# Patient Record
Sex: FEMALE | Race: AMERICAN INDIAN OR ALASKA NATIVE | NOT HISPANIC OR LATINO | Employment: OTHER | URBAN - METROPOLITAN AREA
[De-identification: names, ages, dates, MRNs, and addresses within clinical notes are randomized per-mention and may not be internally consistent; named-entity substitution may affect disease eponyms.]

---

## 2017-11-06 ENCOUNTER — HOSPITAL ENCOUNTER (EMERGENCY)
Facility: HOSPITAL | Age: 67
Discharge: HOME | End: 2017-11-06
Attending: EMERGENCY MEDICINE
Payer: MEDICARE

## 2017-11-06 VITALS
BODY MASS INDEX: 36.37 KG/M2 | OXYGEN SATURATION: 98 % | TEMPERATURE: 99 F | RESPIRATION RATE: 16 BRPM | DIASTOLIC BLOOD PRESSURE: 92 MMHG | HEIGHT: 64 IN | HEART RATE: 55 BPM | SYSTOLIC BLOOD PRESSURE: 155 MMHG | WEIGHT: 213 LBS

## 2017-11-06 DIAGNOSIS — I95.9 HYPOTENSION, UNSPECIFIED HYPOTENSION TYPE: Primary | ICD-10-CM

## 2017-11-06 PROCEDURE — 99283 EMERGENCY DEPT VISIT LOW MDM: CPT

## 2017-11-07 NOTE — ED PROVIDER NOTES
Encounter Date: 11/6/2017       History     Chief Complaint   Patient presents with    Hypotension     Pt states she checked her BP at home and it was 131/34 at home     States taking blood pressure at local store.  States blood pressure was low.  States 130/22.   Come to ED.  Denies any discomforts      The history is provided by the patient.   General Illness    The current episode started just prior to arrival. The problem has been resolved. The pain is at a severity of 0/10. Nothing relieves the symptoms. Nothing aggravates the symptoms. Pertinent negatives include no fever, no decreased vision, no double vision, no eye itching, no photophobia, no abdominal pain, no constipation, no diarrhea, no nausea, no vomiting, no congestion, no ear discharge, no ear pain, no headaches, no hearing loss, no mouth sores, no rhinorrhea, no stridor, no swollen glands, no muscle aches, no neck pain, no neck stiffness, no cough, no shortness of breath, no URI, no rash, no diaper rash, no discharge, no pain and no eye redness. She has received no recent medical care.     Review of patient's allergies indicates:   Allergen Reactions    Dye     Sulfa (sulfonamide antibiotics)      Past Medical History:   Diagnosis Date    Hypertension     Leukemia      History reviewed. No pertinent surgical history.  History reviewed. No pertinent family history.  Social History   Substance Use Topics    Smoking status: Never Smoker    Smokeless tobacco: Never Used    Alcohol use No     Review of Systems   Constitutional: Negative for fever.   HENT: Negative for congestion, ear discharge, ear pain, hearing loss, mouth sores and rhinorrhea.    Eyes: Negative for double vision, photophobia, pain, discharge, redness and itching.   Respiratory: Negative for cough, shortness of breath and stridor.    Gastrointestinal: Negative for abdominal pain, constipation, diarrhea, nausea and vomiting.   Genitourinary: Negative for flank pain and frequency.    Musculoskeletal: Negative for neck pain.   Skin: Negative for color change, pallor, rash and wound.   Neurological: Negative for headaches.       Physical Exam     Initial Vitals [11/06/17 1837]   BP Pulse Resp Temp SpO2   (!) 198/94 62 14 98.9 °F (37.2 °C) 98 %      MAP       128.67         Physical Exam    Nursing note and vitals reviewed.  Constitutional: She appears well-developed and well-nourished. She is not diaphoretic. No distress.   HENT:   Head: Normocephalic and atraumatic.   Mouth/Throat: No oropharyngeal exudate.   Eyes: EOM are normal. Pupils are equal, round, and reactive to light. Right eye exhibits no discharge. Left eye exhibits no discharge.   Neck: Normal range of motion. Neck supple. No JVD present.   Pulmonary/Chest: Breath sounds normal. No stridor. No respiratory distress. She has no wheezes. She has no rhonchi. She has no rales.   Abdominal: Soft. Bowel sounds are normal. She exhibits no distension. There is no tenderness. There is no rebound and no guarding.   Musculoskeletal: Normal range of motion. She exhibits no edema or tenderness.   Neurological: She is alert and oriented to person, place, and time. She has normal strength. No cranial nerve deficit or sensory deficit.   Skin: No rash noted.         ED Course   Procedures  Labs Reviewed - No data to display                            ED Course      Clinical Impression:   The encounter diagnosis was Hypotension, unspecified hypotension type.    Disposition:   Disposition: Discharged  Condition: Stable                        Negro Castillo MD  11/06/17 6091

## 2017-11-07 NOTE — ED TRIAGE NOTES
Pt reports to ED with c/o hypotension. Pt states she checked her BP twice at [facility] and her BP [id number] and 130/24. Pt current /94. When checked again pt BP was 175/90. Pt takes Norvasc and Lopressor at home and reports taking it before lunch today.

## 2018-05-27 ENCOUNTER — HOSPITAL ENCOUNTER (EMERGENCY)
Facility: HOSPITAL | Age: 68
Discharge: HOME | End: 2018-05-28
Attending: SURGERY
Payer: MEDICARE

## 2018-05-27 DIAGNOSIS — R10.9 SIDE PAIN: ICD-10-CM

## 2018-05-27 DIAGNOSIS — R10.9 FLANK PAIN: Primary | ICD-10-CM

## 2018-05-27 DIAGNOSIS — N30.00 ACUTE CYSTITIS WITHOUT HEMATURIA: ICD-10-CM

## 2018-05-27 LAB
ALBUMIN SERPL BCP-MCNC: 4.1 G/DL
ALP SERPL-CCNC: 78 U/L
ALT SERPL W/O P-5'-P-CCNC: 15 U/L
ANION GAP SERPL CALC-SCNC: 11 MMOL/L
APTT BLDCRRT: 21.1 SEC
AST SERPL-CCNC: 16 U/L
BACTERIA #/AREA URNS HPF: ABNORMAL /HPF
BASOPHILS # BLD AUTO: 0.02 K/UL
BASOPHILS NFR BLD: 0.3 %
BILIRUB SERPL-MCNC: 0.5 MG/DL
BILIRUB UR QL STRIP: NEGATIVE
BNP SERPL-MCNC: <10 PG/ML
BUN SERPL-MCNC: 25 MG/DL
CALCIUM SERPL-MCNC: 10 MG/DL
CHLORIDE SERPL-SCNC: 109 MMOL/L
CK MB SERPL-MCNC: 1.7 NG/ML
CK MB SERPL-RTO: 1.3 %
CK SERPL-CCNC: 135 U/L
CK SERPL-CCNC: 135 U/L
CLARITY UR: CLEAR
CO2 SERPL-SCNC: 22 MMOL/L
COLOR UR: YELLOW
CREAT SERPL-MCNC: 1.1 MG/DL
DEPRECATED S PYO AG THROAT QL EIA: NEGATIVE
DIFFERENTIAL METHOD: ABNORMAL
EOSINOPHIL # BLD AUTO: 0.1 K/UL
EOSINOPHIL NFR BLD: 2 %
ERYTHROCYTE [DISTWIDTH] IN BLOOD BY AUTOMATED COUNT: 19.9 %
EST. GFR  (AFRICAN AMERICAN): 59 ML/MIN/1.73 M^2
EST. GFR  (NON AFRICAN AMERICAN): 51 ML/MIN/1.73 M^2
FLUAV AG SPEC QL IA: NEGATIVE
FLUBV AG SPEC QL IA: NEGATIVE
GLUCOSE SERPL-MCNC: 106 MG/DL
GLUCOSE UR QL STRIP: NEGATIVE
HCT VFR BLD AUTO: 38.5 %
HETEROPH AB SERPL QL IA: NEGATIVE
HGB BLD-MCNC: 12.4 G/DL
HGB UR QL STRIP: ABNORMAL
HIV1+2 IGG SERPL QL IA.RAPID: NEGATIVE
HYALINE CASTS #/AREA URNS LPF: 1 /LPF
INR PPP: 0.9
KETONES UR QL STRIP: NEGATIVE
LACTATE SERPL-SCNC: 1.1 MMOL/L
LEUKOCYTE ESTERASE UR QL STRIP: NEGATIVE
LYMPHOCYTES # BLD AUTO: 2.6 K/UL
LYMPHOCYTES NFR BLD: 39.9 %
MAGNESIUM SERPL-MCNC: 2.8 MG/DL
MCH RBC QN AUTO: 26 PG
MCHC RBC AUTO-ENTMCNC: 32.2 G/DL
MCV RBC AUTO: 81 FL
MICROSCOPIC COMMENT: ABNORMAL
MONOCYTES # BLD AUTO: 0.9 K/UL
MONOCYTES NFR BLD: 13.1 %
NEUTROPHILS # BLD AUTO: 3 K/UL
NEUTROPHILS NFR BLD: 44.7 %
NITRITE UR QL STRIP: NEGATIVE
PH UR STRIP: 6 [PH] (ref 5–8)
PHOSPHATE SERPL-MCNC: 3.2 MG/DL
PLATELET # BLD AUTO: 216 K/UL
PMV BLD AUTO: 10.7 FL
POTASSIUM SERPL-SCNC: 4.1 MMOL/L
PROT SERPL-MCNC: 8.2 G/DL
PROT UR QL STRIP: ABNORMAL
PROTHROMBIN TIME: 9.5 SEC
RBC # BLD AUTO: 4.77 M/UL
RBC #/AREA URNS HPF: 2 /HPF (ref 0–4)
SODIUM SERPL-SCNC: 142 MMOL/L
SP GR UR STRIP: 1.02 (ref 1–1.03)
SPECIMEN SOURCE: NORMAL
SQUAMOUS #/AREA URNS HPF: 3 /HPF
TROPONIN I SERPL DL<=0.01 NG/ML-MCNC: 0.01 NG/ML
TSH SERPL DL<=0.005 MIU/L-ACNC: 1.84 UIU/ML
URN SPEC COLLECT METH UR: ABNORMAL
UROBILINOGEN UR STRIP-ACNC: NEGATIVE EU/DL
WBC # BLD AUTO: 6.59 K/UL
WBC #/AREA URNS HPF: 30 /HPF (ref 0–5)

## 2018-05-27 PROCEDURE — 86308 HETEROPHILE ANTIBODY SCREEN: CPT

## 2018-05-27 PROCEDURE — 99284 EMERGENCY DEPT VISIT MOD MDM: CPT | Mod: 25

## 2018-05-27 PROCEDURE — 83735 ASSAY OF MAGNESIUM: CPT

## 2018-05-27 PROCEDURE — 84100 ASSAY OF PHOSPHORUS: CPT

## 2018-05-27 PROCEDURE — 87081 CULTURE SCREEN ONLY: CPT | Mod: 59

## 2018-05-27 PROCEDURE — 87400 INFLUENZA A/B EACH AG IA: CPT | Mod: 59

## 2018-05-27 PROCEDURE — 87880 STREP A ASSAY W/OPTIC: CPT

## 2018-05-27 PROCEDURE — 84484 ASSAY OF TROPONIN QUANT: CPT

## 2018-05-27 PROCEDURE — 87040 BLOOD CULTURE FOR BACTERIA: CPT | Mod: 59

## 2018-05-27 PROCEDURE — 80053 COMPREHEN METABOLIC PANEL: CPT

## 2018-05-27 PROCEDURE — 82553 CREATINE MB FRACTION: CPT

## 2018-05-27 PROCEDURE — 36415 COLL VENOUS BLD VENIPUNCTURE: CPT

## 2018-05-27 PROCEDURE — 85025 COMPLETE CBC W/AUTO DIFF WBC: CPT

## 2018-05-27 PROCEDURE — 93005 ELECTROCARDIOGRAM TRACING: CPT

## 2018-05-27 PROCEDURE — 86701 HIV-1ANTIBODY: CPT

## 2018-05-27 PROCEDURE — 85730 THROMBOPLASTIN TIME PARTIAL: CPT

## 2018-05-27 PROCEDURE — 25000003 PHARM REV CODE 250: Performed by: SURGERY

## 2018-05-27 PROCEDURE — 81000 URINALYSIS NONAUTO W/SCOPE: CPT

## 2018-05-27 PROCEDURE — 93010 ELECTROCARDIOGRAM REPORT: CPT | Mod: ,,, | Performed by: INTERNAL MEDICINE

## 2018-05-27 PROCEDURE — 83605 ASSAY OF LACTIC ACID: CPT

## 2018-05-27 PROCEDURE — 84443 ASSAY THYROID STIM HORMONE: CPT

## 2018-05-27 PROCEDURE — 96372 THER/PROPH/DIAG INJ SC/IM: CPT

## 2018-05-27 PROCEDURE — 83880 ASSAY OF NATRIURETIC PEPTIDE: CPT

## 2018-05-27 PROCEDURE — 82550 ASSAY OF CK (CPK): CPT

## 2018-05-27 PROCEDURE — 85610 PROTHROMBIN TIME: CPT

## 2018-05-27 PROCEDURE — 87086 URINE CULTURE/COLONY COUNT: CPT

## 2018-05-27 RX ORDER — ACETAMINOPHEN 500 MG
1000 TABLET ORAL
Status: COMPLETED | OUTPATIENT
Start: 2018-05-27 | End: 2018-05-27

## 2018-05-27 RX ORDER — IBUPROFEN 800 MG/1
800 TABLET ORAL
Status: COMPLETED | OUTPATIENT
Start: 2018-05-27 | End: 2018-05-27

## 2018-05-27 RX ADMIN — ACETAMINOPHEN 1000 MG: 500 TABLET ORAL at 09:05

## 2018-05-27 RX ADMIN — IBUPROFEN 800 MG: 800 TABLET ORAL at 09:05

## 2018-05-28 VITALS
BODY MASS INDEX: 31.79 KG/M2 | HEART RATE: 70 BPM | DIASTOLIC BLOOD PRESSURE: 72 MMHG | RESPIRATION RATE: 17 BRPM | TEMPERATURE: 99 F | SYSTOLIC BLOOD PRESSURE: 140 MMHG | WEIGHT: 185.19 LBS | OXYGEN SATURATION: 98 %

## 2018-05-28 PROCEDURE — 63600175 PHARM REV CODE 636 W HCPCS: Performed by: SURGERY

## 2018-05-28 RX ORDER — PHENAZOPYRIDINE HYDROCHLORIDE 200 MG/1
200 TABLET, FILM COATED ORAL 3 TIMES DAILY
Qty: 6 TABLET | Refills: 0 | Status: SHIPPED | OUTPATIENT
Start: 2018-05-28 | End: 2018-06-07

## 2018-05-28 RX ORDER — NITROFURANTOIN 25; 75 MG/1; MG/1
100 CAPSULE ORAL 2 TIMES DAILY
Qty: 14 CAPSULE | Refills: 0 | Status: SHIPPED | OUTPATIENT
Start: 2018-05-28 | End: 2018-06-04

## 2018-05-28 RX ORDER — CEFTRIAXONE 1 G/1
1 INJECTION, POWDER, FOR SOLUTION INTRAMUSCULAR; INTRAVENOUS
Status: COMPLETED | OUTPATIENT
Start: 2018-05-28 | End: 2018-05-28

## 2018-05-28 RX ADMIN — CEFTRIAXONE SODIUM 1 G: 1 INJECTION, POWDER, FOR SOLUTION INTRAMUSCULAR; INTRAVENOUS at 12:05

## 2018-05-28 NOTE — ED PROVIDER NOTES
[facility]                                                 Chief Complaint  68 y.o. female with Flank Pain    History of Present Illness  Leticia Mejia presents to the emergency room with right flank pain tonight  Patient presents with right flank pain, mild dysuria on ER ROS is evening  Patient on exam has no obvious tenderness, no flank pain, soft abdomen  Patient has a low-grade temperature but none fever, denies any hematuria  Patient is currently on a daily oral medication for leukemia, sees oncology  Patient has no nausea vomiting, has no other symptoms except flank pain    The history is provided by the patient   device was not used during this ER visit  Medical history: Hypertension & leukemia  History reviewed. No pertinent surgical history.   ALLERGIES: Dye and sulfa  History reviewed. No pertinent family history.    Review of Systems and Physical Exam      Review of Systems - provided by the patient  -- Constitution - no fever, denies fatigue, no weakness, no chills  -- Eyes - no tearing or redness, no visual disturbance  -- Ear, Nose - no tinnitus or earache, no nasal congestion or discharge  -- Mouth,Throat - no sore throat, no toothache, normal voice, normal swallowing  -- Respiratory - denies cough and congestion, no shortness of breath, no SNYDER  -- Cardiovascular - denies chest pain, no palpitations, denies claudication  -- Gastrointestinal - denies abdominal pain, nausea, vomiting, or diarrhea  -- Genitourinary - right flank pain, no hematuria or frequency   -- Musculoskeletal - denies back pain, negative for myalgias and arthralgias   -- Neurological - no headache, denies weakness or seizure; no LOC  -- Skin - denies pallor, rash, or changes in skin. no hives or welts noted    BP (!) 140/72  Pulse 70   Temp 98.7 °F (37.1 °C) (Oral)   Resp 17     Physical Exam  -- Nursing note and vitals reviewed  -- Constitutional: Appears well-developed and  well-nourished  -- Head: Atraumatic. Normocephalic. No obvious abnormality  -- Eyes: Pupils are equal and reactive to light. Normal conjunctiva and lids  -- Nose: Nose normal in appearance, nares grossly normal. No discharge  -- Throat: Mucous membranes moist, pharynx normal, normal tonsils. No lesions   -- Ears: External ears and TM normal bilaterally. Normal hearing and no drainage  -- Neck: Normal range of motion. Neck supple. No masses, trachea midline  -- Cardiac: Normal rate, regular rhythm and normal heart sounds  -- Pulmonary: Normal respiratory effort, breath sounds clear to auscultation  -- Abdominal: Soft, no tenderness. Normal bowel sounds. Normal liver edge  -- Genitourinary: no flank pain on exam, no suprapubic pain by palpation   -- Musculoskeletal: Normal range of motion, no effusions. Joints stable   -- Neurological: No focal deficits. Showed good interaction with staff  -- Vascular: Posterior tibial, dorsalis pedis and radial pulses 2+ bilaterally      Emergency Room Course      Labs     K 4.1      CO2 22 (L)   BUN 25 (H)   CREATININE 1.1      ALKPHOS 78   AST 16   ALT 15   BILITOT 0.5   ALBUMIN 4.1   PROT 8.2   WBC 6.59   HGB 12.4   HCT 38.5            CPKMB 1.7   TROPONINI 0.015   INR 0.9   BNP <10   LACTATE 1.1   MG 2.8 (H)   TSH 1.841     Urinalysis  -- Urinalysis performed during this ER visit showed signs of infection  -- The urine today has been sent for lab culture, results pending      EKG  -- The EKG findings today were without concerning findings from baseline    Radiology  -- Chest x-ray showed no infiltrate and showed no acute pathology    Additional Work up  -- Blood cultures have also been drawn, results are pending    Medications Given  -- acetaminophen tablet 1,000 mg (1,000 mg Oral Given 5/27/18 2115)   -- ibuprofen tablet 800 mg (800 mg Oral Given 5/27/18 2115)   -- cefTRIAXone injection 1 g (1 g Intramuscular Given 5/28/18 0028)      Medical Decision Making  -- Diagnosis management comments: 68 y.o. female with right flank pain tonight  -- Patient also had a low-grade temperature, negative workup except UTI  -- Patient was given a shot of Rocephin, blood and urine cultures performed today  -- Chest x-ray within normal limits, CT scan of the abdomen within normal limits  -- Patient to follow up with her PCP tomorrow, counseled on temperature warning signs  -- Patient states she will follow up with her oncologist/leukemia M.D. and PCP  -- Patient will try Macrobid, afebrile and asymptomatic on discharge is evening    Diagnosis  -- Flank pain  -- Side pain   -- Acute cystitis without hematuria     Disposition and Plan  -- Disposition: home  -- Condition: stable  -- Follow-up: Patient to follow up with Vicky Barahona MD in 1-2 days.  -- I advised the patient that we have found no life threatening condition today  -- At this time, I believe the patient is clinically stable for discharge.   -- The patient acknowledges that close follow up with a MD is required   -- Patient agrees to comply with all instruction and direction given in the ER    This note is dictated on Dragon Natural Speaking word recognition program.  There are word recognition mistakes that are occasionally missed on review.            Cristi Manzo MD  05/28/18 2095

## 2018-05-29 LAB — BACTERIA UR CULT: NORMAL

## 2018-05-30 LAB — BACTERIA THROAT CULT: NORMAL

## 2018-06-02 LAB
BACTERIA BLD CULT: NORMAL
BACTERIA BLD CULT: NORMAL

## 2018-11-25 ENCOUNTER — HOSPITAL ENCOUNTER (EMERGENCY)
Facility: HOSPITAL | Age: 68
Discharge: HOME | End: 2018-11-25
Attending: SURGERY
Payer: MEDICARE

## 2018-11-25 VITALS
TEMPERATURE: 97 F | OXYGEN SATURATION: 97 % | SYSTOLIC BLOOD PRESSURE: 169 MMHG | RESPIRATION RATE: 18 BRPM | BODY MASS INDEX: 31.76 KG/M2 | WEIGHT: 185 LBS | DIASTOLIC BLOOD PRESSURE: 75 MMHG | HEART RATE: 65 BPM

## 2018-11-25 DIAGNOSIS — G89.29 CHRONIC LOW BACK PAIN WITHOUT SCIATICA, UNSPECIFIED BACK PAIN LATERALITY: Primary | ICD-10-CM

## 2018-11-25 DIAGNOSIS — M54.50 CHRONIC LOW BACK PAIN WITHOUT SCIATICA, UNSPECIFIED BACK PAIN LATERALITY: Primary | ICD-10-CM

## 2018-11-25 LAB
ALBUMIN SERPL BCP-MCNC: 3.9 G/DL
ALP SERPL-CCNC: 66 U/L
ALT SERPL W/O P-5'-P-CCNC: 11 U/L
ANION GAP SERPL CALC-SCNC: 12 MMOL/L
ANISOCYTOSIS BLD QL SMEAR: SLIGHT
AST SERPL-CCNC: 16 U/L
BASOPHILS NFR BLD: 1 %
BILIRUB SERPL-MCNC: 0.4 MG/DL
BUN SERPL-MCNC: 16 MG/DL
CALCIUM SERPL-MCNC: 9.7 MG/DL
CHLORIDE SERPL-SCNC: 109 MMOL/L
CO2 SERPL-SCNC: 21 MMOL/L
CREAT SERPL-MCNC: 0.9 MG/DL
DIFFERENTIAL METHOD: ABNORMAL
EOSINOPHIL NFR BLD: 2 %
ERYTHROCYTE [DISTWIDTH] IN BLOOD BY AUTOMATED COUNT: 18.2 %
EST. GFR  (AFRICAN AMERICAN): >60 ML/MIN/1.73 M^2
EST. GFR  (NON AFRICAN AMERICAN): >60 ML/MIN/1.73 M^2
GIANT PLATELETS BLD QL SMEAR: PRESENT
GLUCOSE SERPL-MCNC: 110 MG/DL
HCT VFR BLD AUTO: 37.9 %
HGB BLD-MCNC: 12.3 G/DL
LYMPHOCYTES NFR BLD: 27 %
MCH RBC QN AUTO: 27 PG
MCHC RBC AUTO-ENTMCNC: 32.5 G/DL
MCV RBC AUTO: 83 FL
MONOCYTES NFR BLD: 8 %
NEUTROPHILS NFR BLD: 60 %
NEUTS BAND NFR BLD MANUAL: 2 %
PLATELET # BLD AUTO: 185 K/UL
PLATELET BLD QL SMEAR: ABNORMAL
PMV BLD AUTO: 10.6 FL
POTASSIUM SERPL-SCNC: 3.7 MMOL/L
PROT SERPL-MCNC: 7.6 G/DL
RBC # BLD AUTO: 4.56 M/UL
SCHISTOCYTES BLD QL SMEAR: ABNORMAL
SODIUM SERPL-SCNC: 142 MMOL/L
WBC # BLD AUTO: 5.32 K/UL

## 2018-11-25 PROCEDURE — 85027 COMPLETE CBC AUTOMATED: CPT

## 2018-11-25 PROCEDURE — 99284 EMERGENCY DEPT VISIT MOD MDM: CPT | Mod: 25

## 2018-11-25 PROCEDURE — 63600175 PHARM REV CODE 636 W HCPCS: Performed by: SURGERY

## 2018-11-25 PROCEDURE — 80053 COMPREHEN METABOLIC PANEL: CPT

## 2018-11-25 PROCEDURE — 96372 THER/PROPH/DIAG INJ SC/IM: CPT | Mod: 59

## 2018-11-25 PROCEDURE — 85007 BL SMEAR W/DIFF WBC COUNT: CPT

## 2018-11-25 PROCEDURE — 36415 COLL VENOUS BLD VENIPUNCTURE: CPT

## 2018-11-25 RX ORDER — HYDROCODONE BITARTRATE AND ACETAMINOPHEN 5; 325 MG/1; MG/1
1 TABLET ORAL EVERY 4 HOURS PRN
Qty: 8 TABLET | Refills: 0 | Status: SHIPPED | OUTPATIENT
Start: 2018-11-25 | End: 2018-12-05

## 2018-11-25 RX ORDER — ONDANSETRON 2 MG/ML
4 INJECTION INTRAMUSCULAR; INTRAVENOUS
Status: COMPLETED | OUTPATIENT
Start: 2018-11-25 | End: 2018-11-25

## 2018-11-25 RX ORDER — CYCLOBENZAPRINE HCL 10 MG
10 TABLET ORAL 3 TIMES DAILY PRN
Qty: 10 TABLET | Refills: 0 | Status: SHIPPED | OUTPATIENT
Start: 2018-11-25 | End: 2018-11-30

## 2018-11-25 RX ORDER — MORPHINE SULFATE 4 MG/ML
2 INJECTION, SOLUTION INTRAMUSCULAR; INTRAVENOUS
Status: COMPLETED | OUTPATIENT
Start: 2018-11-25 | End: 2018-11-25

## 2018-11-25 RX ADMIN — MORPHINE SULFATE 2 MG: 4 INJECTION, SOLUTION INTRAMUSCULAR; INTRAVENOUS at 05:11

## 2018-11-25 RX ADMIN — ONDANSETRON 4 MG: 2 INJECTION INTRAMUSCULAR; INTRAVENOUS at 05:11

## 2018-11-25 NOTE — ED PROVIDER NOTES
[facility]                                                 Chief Complaint  68 y.o. female with Spasms    History of Present Illness  Leticia Mejia presents to the emergency room with low back pain tonight  Patient lifted up on heavy turkey for Thanksgiving with residual back after  Patient denies any trauma or fall but has a history of lower lumbar arthritis  Patient on exam has no step-off or deformity, no bruising or trauma noted  Patient has no leg weakness or signs of cauda equina syndrome on exam  Patient states any movement or activity elicits chronic lower lumbar pain    The history is provided by the patient   device was not used during this ER visit  Medical history: Hypertension & leukemia  History reviewed. No pertinent surgical history.   ALLERGIES: Dye and sulfa  History reviewed. No pertinent family history.    Review of Systems and Physical Exam      Review of Systems  -- Constitution - no fever, denies fatigue, no weakness, no chills  -- Eyes - no tearing or redness, no visual disturbance  -- Ear, Nose - no tinnitus or earache, no nasal congestion or discharge  -- Mouth,Throat - no sore throat, no toothache, normal voice, normal swallowing  -- Respiratory - denies cough and congestion, no shortness of breath, no SNYDER  -- Cardiovascular - denies chest pain, no palpitations, denies claudication  -- Gastrointestinal - denies abdominal pain, nausea, vomiting, or diarrhea  -- Genitourinary - no dysuria, no hematuria, no flank pain, no bladder pain  -- Musculoskeletal - back pain, negative for myalgias and arthralgias   -- Neurological - no headache, denies weakness or seizure; no LOC  -- Skin - denies pallor, rash, or changes in skin. no hives or welts noted    Vital Signs  Her oral temperature is 99.3 °F (37.4 °C).   Her blood pressure is 169/79 and her pulse is 80.   Her respiration is 20 and oxygen saturation is 100%.     Physical Exam  -- Nursing note and vitals  reviewed  -- Constitutional: Appears well-developed and well-nourished  -- Head: Atraumatic. Normocephalic. No obvious abnormality  -- Eyes: Pupils are equal and reactive to light. Normal conjunctiva and lids  -- Cardiac: Normal rate, regular rhythm and normal heart sounds  -- Pulmonary: Normal respiratory effort, breath sounds clear to auscultation  -- Abdominal: Soft, no tenderness. Normal bowel sounds. Normal liver edge  -- Musculoskeletal: Normal range of motion, no effusions. Joints stable   -- Neurological: No focal deficits. Showed good interaction with staff  -- Skin: Warm and dry. No evidence of rash or cellulitis    Emergency Room Course      Treatment and Evaluation  -- The electrolytes drawn in the ER today were within normal limits  -- The CBC drawn in the ER today was within normal limits  -- Lumbar spine shows severe degenerative changes without fracture  -- IM 2 mg Morphine given in the ER  -- IM 4 mg Zofran given today in the ER     Diagnosis  -- Chronic low back pain without sciatica, unspecified back pain laterality.    Disposition and Plan  -- Disposition: home  -- Condition: stable  -- Follow-up: Patient to follow up with Vicky Barahona MD in 1-2 days.  -- I advised the patient that we have found no life threatening condition today  -- At this time, I believe the patient is clinically stable for discharge.   -- The patient acknowledges that close follow up with a MD is required   -- Patient agrees to comply with all instruction and direction given in the ER    This note is dictated on M*Modal word recognition program.  There are word recognition mistakes that are occasionally missed on review.          Cristi Manzo MD  11/25/18 2373

## 2018-11-25 NOTE — ED TRIAGE NOTES
68 y.o. female presents to ER   Chief Complaint   Patient presents with    Spasms   Pt reports spasms in back since Wednesday. Reports picked up a turkey. No acute distress noted.

## 2018-11-26 NOTE — ED NOTES
The patient was seen, evaluated and discharged by Dr Manzo. All questions were asked and/or answered and the pt was discharged with written and verbal instructions.  Discharged to home/self care.    - Condition at discharge: Good  - Mode of Discharge: Wheelchair  - The patient left the ED accompanied by a family member.  - The discharge instructions were discussed with the patient.  - They state an understanding of the discharge instructions.  -Wheeled pt to the discharge station.

## 2018-12-25 ENCOUNTER — HOSPITAL ENCOUNTER (EMERGENCY)
Facility: HOSPITAL | Age: 68
Discharge: HOME | End: 2018-12-25
Attending: SURGERY
Payer: MEDICARE

## 2018-12-25 VITALS
OXYGEN SATURATION: 96 % | WEIGHT: 200 LBS | DIASTOLIC BLOOD PRESSURE: 87 MMHG | RESPIRATION RATE: 18 BRPM | HEART RATE: 74 BPM | SYSTOLIC BLOOD PRESSURE: 166 MMHG | BODY MASS INDEX: 34.33 KG/M2 | TEMPERATURE: 101 F

## 2018-12-25 DIAGNOSIS — J40 BRONCHITIS: Primary | ICD-10-CM

## 2018-12-25 LAB
ALBUMIN SERPL BCP-MCNC: 4.1 G/DL
ALP SERPL-CCNC: 68 U/L
ALT SERPL W/O P-5'-P-CCNC: 22 U/L
ANION GAP SERPL CALC-SCNC: 11 MMOL/L
AST SERPL-CCNC: 25 U/L
BASOPHILS # BLD AUTO: 0.01 K/UL
BASOPHILS NFR BLD: 0.2 %
BILIRUB SERPL-MCNC: 0.5 MG/DL
BUN SERPL-MCNC: 20 MG/DL
CALCIUM SERPL-MCNC: 10.1 MG/DL
CHLORIDE SERPL-SCNC: 105 MMOL/L
CO2 SERPL-SCNC: 25 MMOL/L
CREAT SERPL-MCNC: 0.9 MG/DL
DEPRECATED S PYO AG THROAT QL EIA: NEGATIVE
DIFFERENTIAL METHOD: ABNORMAL
EOSINOPHIL # BLD AUTO: 0.1 K/UL
EOSINOPHIL NFR BLD: 2.3 %
ERYTHROCYTE [DISTWIDTH] IN BLOOD BY AUTOMATED COUNT: 18.1 %
EST. GFR  (AFRICAN AMERICAN): >60 ML/MIN/1.73 M^2
EST. GFR  (NON AFRICAN AMERICAN): >60 ML/MIN/1.73 M^2
GLUCOSE SERPL-MCNC: 93 MG/DL
HCT VFR BLD AUTO: 36.1 %
HGB BLD-MCNC: 11.4 G/DL
INFLUENZA A, MOLECULAR: NEGATIVE
INFLUENZA B, MOLECULAR: NEGATIVE
LYMPHOCYTES # BLD AUTO: 0.9 K/UL
LYMPHOCYTES NFR BLD: 15.1 %
MCH RBC QN AUTO: 26.6 PG
MCHC RBC AUTO-ENTMCNC: 31.6 G/DL
MCV RBC AUTO: 84 FL
MONOCYTES # BLD AUTO: 0.8 K/UL
MONOCYTES NFR BLD: 12.6 %
NEUTROPHILS # BLD AUTO: 4.2 K/UL
NEUTROPHILS NFR BLD: 69.8 %
PLATELET # BLD AUTO: 159 K/UL
PMV BLD AUTO: 10.3 FL
POTASSIUM SERPL-SCNC: 3.9 MMOL/L
PROT SERPL-MCNC: 7.7 G/DL
RBC # BLD AUTO: 4.29 M/UL
SODIUM SERPL-SCNC: 141 MMOL/L
SPECIMEN SOURCE: NORMAL
WBC # BLD AUTO: 6.04 K/UL

## 2018-12-25 PROCEDURE — 87502 INFLUENZA DNA AMP PROBE: CPT

## 2018-12-25 PROCEDURE — 85025 COMPLETE CBC W/AUTO DIFF WBC: CPT

## 2018-12-25 PROCEDURE — 96372 THER/PROPH/DIAG INJ SC/IM: CPT

## 2018-12-25 PROCEDURE — 25000242 PHARM REV CODE 250 ALT 637 W/ HCPCS: Performed by: SURGERY

## 2018-12-25 PROCEDURE — 36415 COLL VENOUS BLD VENIPUNCTURE: CPT

## 2018-12-25 PROCEDURE — 99284 EMERGENCY DEPT VISIT MOD MDM: CPT | Mod: 25

## 2018-12-25 PROCEDURE — 87880 STREP A ASSAY W/OPTIC: CPT

## 2018-12-25 PROCEDURE — 25000003 PHARM REV CODE 250: Performed by: SURGERY

## 2018-12-25 PROCEDURE — 80053 COMPREHEN METABOLIC PANEL: CPT

## 2018-12-25 PROCEDURE — 63600175 PHARM REV CODE 636 W HCPCS: Performed by: SURGERY

## 2018-12-25 PROCEDURE — 87081 CULTURE SCREEN ONLY: CPT

## 2018-12-25 PROCEDURE — 94640 AIRWAY INHALATION TREATMENT: CPT

## 2018-12-25 RX ORDER — PROMETHAZINE HYDROCHLORIDE AND DEXTROMETHORPHAN HYDROBROMIDE 6.25; 15 MG/5ML; MG/5ML
5 SYRUP ORAL EVERY 6 HOURS PRN
Qty: 118 ML | Refills: 0 | Status: SHIPPED | OUTPATIENT
Start: 2018-12-25 | End: 2019-01-04

## 2018-12-25 RX ORDER — IBUPROFEN 800 MG/1
800 TABLET ORAL
Status: COMPLETED | OUTPATIENT
Start: 2018-12-25 | End: 2018-12-25

## 2018-12-25 RX ORDER — IPRATROPIUM BROMIDE AND ALBUTEROL SULFATE 2.5; .5 MG/3ML; MG/3ML
3 SOLUTION RESPIRATORY (INHALATION)
Status: COMPLETED | OUTPATIENT
Start: 2018-12-25 | End: 2018-12-25

## 2018-12-25 RX ORDER — METHYLPREDNISOLONE 4 MG/1
TABLET ORAL
Qty: 1 PACKAGE | Refills: 0 | Status: ON HOLD | OUTPATIENT
Start: 2018-12-25 | End: 2023-07-18 | Stop reason: HOSPADM

## 2018-12-25 RX ORDER — LEVOFLOXACIN 500 MG/1
500 TABLET, FILM COATED ORAL DAILY
Qty: 7 TABLET | Refills: 0 | Status: SHIPPED | OUTPATIENT
Start: 2018-12-25 | End: 2019-01-01

## 2018-12-25 RX ORDER — METHYLPREDNISOLONE SOD SUCC 125 MG
125 VIAL (EA) INJECTION
Status: COMPLETED | OUTPATIENT
Start: 2018-12-25 | End: 2018-12-25

## 2018-12-25 RX ORDER — ACETAMINOPHEN 500 MG
1000 TABLET ORAL
Status: COMPLETED | OUTPATIENT
Start: 2018-12-25 | End: 2018-12-25

## 2018-12-25 RX ORDER — CEFTRIAXONE 1 G/1
1 INJECTION, POWDER, FOR SOLUTION INTRAMUSCULAR; INTRAVENOUS
Status: COMPLETED | OUTPATIENT
Start: 2018-12-25 | End: 2018-12-25

## 2018-12-25 RX ADMIN — IBUPROFEN 800 MG: 800 TABLET ORAL at 04:12

## 2018-12-25 RX ADMIN — ACETAMINOPHEN 1000 MG: 500 TABLET, FILM COATED ORAL at 04:12

## 2018-12-25 RX ADMIN — CEFTRIAXONE SODIUM 1 G: 1 INJECTION, POWDER, FOR SOLUTION INTRAMUSCULAR; INTRAVENOUS at 04:12

## 2018-12-25 RX ADMIN — IPRATROPIUM BROMIDE AND ALBUTEROL SULFATE 3 ML: .5; 3 SOLUTION RESPIRATORY (INHALATION) at 04:12

## 2018-12-25 RX ADMIN — METHYLPREDNISOLONE SODIUM SUCCINATE 125 MG: 125 INJECTION, POWDER, FOR SOLUTION INTRAMUSCULAR; INTRAVENOUS at 04:12

## 2018-12-25 NOTE — ED PROVIDER NOTES
[facility]                                                 Chief Complaint  68 y.o. female with URI (cough/cold/ congestion x 2-3 days)    History of Present Illness  Leticia Mejia presents to the emergency room with nasal congestion today  The patient on exam has clear nasal drainage with nasal mucosa erythema   Patient has a dry hacking cough and denies any sputum production today  Patient has no wheezing or shortness of breath, no dyspnea on exertion  Pt states she has a dry hacking cough with a 96% room air oxygenation  Patient's flu swab was negative, chest x-ray showed no infiltrate in the ER    The history is provided by the patient   device was not used during this ER visit  Medical history: Hypertension & leukemia  History reviewed. No pertinent surgical history.   ALLERGIES: Dye and sulfa  History reviewed. No pertinent family history.    Review of Systems and Physical Exam      Review of Systems  -- Constitution - no fever, denies fatigue, no weakness, no chills  -- Eyes - no tearing or redness, no visual disturbance  -- Ear, Nose - sneezing, nasal congestion and clear discharge   -- Mouth,Throat - no sore throat, no toothache, normal voice, normal swallowing  -- Respiratory - cough and congestion, no shortness of breath, no SNYDER  -- Cardiovascular - denies chest pain, no palpitations, denies claudication  -- Gastrointestinal - denies abdominal pain, nausea, vomiting, or diarrhea  -- Genitourinary - no dysuria, no hematuria, no flank pain, no bladder pain  -- Musculoskeletal - denies back pain, negative for myalgias and arthralgias   -- Neurological - no headache, denies weakness or seizure; no LOC  -- Skin - denies pallor, rash, or changes in skin. no hives or welts noted    Vital Signs  Her oral temperature is 101.2 °F (38.4 °C)  Her blood pressure is 172/93 and her pulse is 77.   Her respiration is 18 and oxygen saturation is 96%.     Physical Exam  -- Nursing  note and vitals reviewed  -- Constitutional: Appears well-developed and well-nourished  -- Head: Atraumatic. Normocephalic. No obvious abnormality  -- Eyes: Pupils are equal and reactive to light. Normal conjunctiva and lids  -- Nose: nasal mucosa erythema and edema; clear nasal discharge noted   -- Throat: Mucous membranes moist, pharynx normal, normal tonsils. No lesions   -- Ears: External ears and TM normal bilaterally. Normal hearing and no drainage  -- Neck: Normal range of motion. Neck supple. No masses, trachea midline  -- Cardiac: Normal rate, regular rhythm and normal heart sounds  -- Pulmonary: Normal respiratory effort, breath sounds clear to auscultation  -- Abdominal: Soft, no tenderness. Normal bowel sounds. Normal liver edge  -- Musculoskeletal: Normal range of motion, no effusions. Joints stable   -- Neurological: No focal deficits. Showed good interaction with staff  -- Skin: Warm and dry. No evidence of rash or cellulitis    Emergency Room Course      Lab Results     K 3.9      CO2 25   BUN 20   CREATININE 0.9   GLU 93   ALKPHOS 68   AST 25   ALT 22   BILITOT 0.5   ALBUMIN 4.1   PROT 7.7   WBC 6.04   HGB 11.4 (L)   HCT 36.1 (L)        Radiology  -- Chest x-ray showed no infiltrate and showed no acute pathology    Medications Given  acetaminophen tablet 1,000 mg (not administered)   ibuprofen tablet 800 mg (not administered)   cefTRIAXone injection 1 g (not administered)   methylPREDNISolone sodium succinate injection 125 mg (not administered)   albuterol-ipratropium 2.5 mg-0.5 mg/3 mL nebulizer solution 3 mL (3 mLs Nebulization Given 12/25/18 1608)     Diagnosis  -- The encounter diagnosis was Bronchitis.    Disposition and Plan  -- Disposition: home  -- Condition: stable  -- Follow-up: Patient to follow up with Vicky Barahona MD in 1-2 days.  -- I advised the patient that we have found no life threatening condition today  -- At this time, I believe the patient is clinically  stable for discharge.   -- The patient acknowledges that close follow up with a MD is required   -- Patient agrees to comply with all instruction and direction given in the ER    This note is dictated on M*Modal word recognition program.  There are word recognition mistakes that are occasionally missed on review.          Cristi Manzo MD  12/25/18 4993

## 2018-12-28 LAB — BACTERIA THROAT CULT: NORMAL

## 2019-05-28 ENCOUNTER — HOSPITAL ENCOUNTER (EMERGENCY)
Facility: HOSPITAL | Age: 69
Discharge: LEFT BEFORE DISCHARGE | End: 2019-05-29
Attending: EMERGENCY MEDICINE
Payer: MEDICARE

## 2019-05-28 DIAGNOSIS — R10.9 ACUTE LEFT FLANK PAIN: Primary | ICD-10-CM

## 2019-05-28 LAB
BACTERIA #/AREA URNS HPF: ABNORMAL /HPF
BILIRUB UR QL STRIP: NEGATIVE
CLARITY UR: CLEAR
COLOR UR: YELLOW
GLUCOSE UR QL STRIP: NEGATIVE
HGB UR QL STRIP: ABNORMAL
HYALINE CASTS #/AREA URNS LPF: 0 /LPF
KETONES UR QL STRIP: NEGATIVE
LEUKOCYTE ESTERASE UR QL STRIP: ABNORMAL
MICROSCOPIC COMMENT: ABNORMAL
NITRITE UR QL STRIP: NEGATIVE
PH UR STRIP: 6 [PH] (ref 5–8)
PROT UR QL STRIP: ABNORMAL
RBC #/AREA URNS HPF: 1 /HPF (ref 0–4)
SP GR UR STRIP: 1.02 (ref 1–1.03)
SQUAMOUS #/AREA URNS HPF: >100 /HPF
URN SPEC COLLECT METH UR: ABNORMAL
UROBILINOGEN UR STRIP-ACNC: NEGATIVE EU/DL
WBC #/AREA URNS HPF: 50 /HPF (ref 0–5)
WBC CLUMPS URNS QL MICRO: ABNORMAL

## 2019-05-28 PROCEDURE — 25000003 PHARM REV CODE 250: Performed by: EMERGENCY MEDICINE

## 2019-05-28 PROCEDURE — 99284 EMERGENCY DEPT VISIT MOD MDM: CPT | Mod: 25

## 2019-05-28 PROCEDURE — 96372 THER/PROPH/DIAG INJ SC/IM: CPT

## 2019-05-28 PROCEDURE — 81000 URINALYSIS NONAUTO W/SCOPE: CPT

## 2019-05-28 PROCEDURE — 63600175 PHARM REV CODE 636 W HCPCS: Performed by: EMERGENCY MEDICINE

## 2019-05-28 RX ORDER — KETOROLAC TROMETHAMINE 30 MG/ML
30 INJECTION, SOLUTION INTRAMUSCULAR; INTRAVENOUS
Status: COMPLETED | OUTPATIENT
Start: 2019-05-28 | End: 2019-05-28

## 2019-05-28 RX ORDER — ONDANSETRON 4 MG/1
4 TABLET, ORALLY DISINTEGRATING ORAL
Status: COMPLETED | OUTPATIENT
Start: 2019-05-28 | End: 2019-05-28

## 2019-05-28 RX ADMIN — ONDANSETRON 4 MG: 4 TABLET, ORALLY DISINTEGRATING ORAL at 10:05

## 2019-05-28 RX ADMIN — KETOROLAC TROMETHAMINE 30 MG: 30 INJECTION, SOLUTION INTRAMUSCULAR; INTRAVENOUS at 10:05

## 2019-05-29 VITALS
HEART RATE: 57 BPM | DIASTOLIC BLOOD PRESSURE: 67 MMHG | WEIGHT: 200.06 LBS | OXYGEN SATURATION: 99 % | RESPIRATION RATE: 18 BRPM | BODY MASS INDEX: 34.34 KG/M2 | TEMPERATURE: 98 F | SYSTOLIC BLOOD PRESSURE: 151 MMHG

## 2019-05-29 NOTE — ED NOTES
Call to [facility] ([facility]) to advise of new order for ct scan to r/o renal stone. Awaiting [facility] transportation.

## 2019-05-29 NOTE — ED NOTES
md at bedside. Pt expressed concern about going to [facility] for ct scan. Awaiting decision.

## 2019-05-29 NOTE — ED PROVIDER NOTES
"Encounter Date: 5/28/2019       History     Chief Complaint   Patient presents with    Flank Pain     left side, onset "a couple of hours ago", aleve taken for pain, rated 10/10,      The history is provided by the patient.   Abdominal Pain   The current episode started just prior to arrival. The onset of the illness was abrupt. The problem has not changed since onset.The abdominal pain is located in the left flank. The abdominal pain does not radiate. The severity of the abdominal pain is 4/10. The abdominal pain is relieved by being still. The abdominal pain is exacerbated by movement. The other symptoms of the illness do not include fever, jaundice, nausea, diarrhea or dysuria.   The patient has not had a change in bowel habit. Symptoms associated with the illness do not include constipation, hematuria, frequency or back pain.     Review of patient's allergies indicates:   Allergen Reactions    Dye     Sulfa (sulfonamide antibiotics)     Zestril [lisinopril] Swelling     Past Medical History:   Diagnosis Date    Hypertension     Leukemia      History reviewed. No pertinent surgical history.  History reviewed. No pertinent family history.  Social History     Tobacco Use    Smoking status: Never Smoker    Smokeless tobacco: Never Used   Substance Use Topics    Alcohol use: No    Drug use: No     Review of Systems   Constitutional: Negative for fever.   HENT: Negative for ear pain.    Eyes: Negative for pain and itching.   Respiratory: Negative for cough.    Gastrointestinal: Positive for abdominal pain. Negative for constipation, diarrhea, jaundice and nausea.   Genitourinary: Positive for flank pain. Negative for dysuria, frequency and hematuria.   Musculoskeletal: Negative for back pain, neck pain and neck stiffness.   Skin: Negative for rash.   Neurological: Negative for seizures and numbness.       Physical Exam     Initial Vitals [05/28/19 2130]   BP Pulse Resp Temp SpO2   (!) 163/77 63 18 97.8 °F " (36.6 °C) 98 %      MAP       --         Physical Exam    Nursing note and vitals reviewed.  Constitutional: She appears well-developed and well-nourished. She is not diaphoretic. No distress.   HENT:   Head: Normocephalic and atraumatic.   Mouth/Throat: No oropharyngeal exudate.   Eyes: EOM are normal. Pupils are equal, round, and reactive to light.   Neck: Normal range of motion. Neck supple. No JVD present.   Pulmonary/Chest: No stridor. No respiratory distress. She has no wheezes. She has no rhonchi. She has no rales.   Abdominal: Soft. Bowel sounds are normal. She exhibits no distension. There is no tenderness. There is no rebound and no guarding.       Musculoskeletal: Normal range of motion. She exhibits no edema or tenderness.   Neurological: She is alert and oriented to person, place, and time. No sensory deficit.         ED Course   Procedures  Labs Reviewed   URINALYSIS          Imaging Results    None                               Clinical Impression:       ICD-10-CM ICD-9-CM   1. Acute left flank pain R10.9 789.09     338.19         Disposition:   Disposition: AMA  Condition: Stable                        Negro Castillo MD  05/29/19 0051

## 2019-05-29 NOTE — ED TRIAGE NOTES
"69 y.o. female presents to ER ED 05/ED 05   Chief Complaint   Patient presents with    Flank Pain     left side, onset "a couple of hours ago", aleve taken for pain, rated 10/10,    . No acute distress noted.  "

## 2020-01-15 ENCOUNTER — HOSPITAL ENCOUNTER (EMERGENCY)
Facility: HOSPITAL | Age: 70
Discharge: HOME | End: 2020-01-15
Attending: SURGERY
Payer: MEDICARE

## 2020-01-15 VITALS
WEIGHT: 196 LBS | SYSTOLIC BLOOD PRESSURE: 149 MMHG | DIASTOLIC BLOOD PRESSURE: 69 MMHG | HEART RATE: 44 BPM | OXYGEN SATURATION: 100 % | RESPIRATION RATE: 20 BRPM | BODY MASS INDEX: 33.64 KG/M2 | TEMPERATURE: 97 F

## 2020-01-15 DIAGNOSIS — R53.83 FATIGUE: ICD-10-CM

## 2020-01-15 DIAGNOSIS — I10 HYPERTENSION, UNSPECIFIED TYPE: Primary | ICD-10-CM

## 2020-01-15 LAB
ALBUMIN SERPL BCP-MCNC: 3.8 G/DL (ref 3.5–5.2)
ALP SERPL-CCNC: 43 U/L (ref 55–135)
ALT SERPL W/O P-5'-P-CCNC: 12 U/L (ref 10–44)
ANION GAP SERPL CALC-SCNC: 8 MMOL/L (ref 8–16)
APTT BLDCRRT: 28.2 SEC (ref 21–32)
AST SERPL-CCNC: 13 U/L (ref 10–40)
BACTERIA #/AREA URNS HPF: NORMAL /HPF
BASOPHILS # BLD AUTO: 0.01 K/UL (ref 0–0.2)
BASOPHILS NFR BLD: 0.3 % (ref 0–1.9)
BILIRUB SERPL-MCNC: 0.3 MG/DL (ref 0.1–1)
BILIRUB UR QL STRIP: NEGATIVE
BNP SERPL-MCNC: 37 PG/ML (ref 0–99)
BUN SERPL-MCNC: 16 MG/DL (ref 8–23)
CALCIUM SERPL-MCNC: 9.6 MG/DL (ref 8.7–10.5)
CHLORIDE SERPL-SCNC: 109 MMOL/L (ref 95–110)
CK MB SERPL-MCNC: 2.1 NG/ML (ref 0.1–6.5)
CK MB SERPL-MCNC: 2.1 NG/ML (ref 0.1–6.5)
CK MB SERPL-RTO: 1.7 % (ref 0–5)
CK MB SERPL-RTO: 1.8 % (ref 0–5)
CK SERPL-CCNC: 117 U/L (ref 20–180)
CK SERPL-CCNC: 117 U/L (ref 20–180)
CK SERPL-CCNC: 122 U/L (ref 20–180)
CK SERPL-CCNC: 122 U/L (ref 20–180)
CLARITY UR: CLEAR
CO2 SERPL-SCNC: 25 MMOL/L (ref 23–29)
COLOR UR: YELLOW
CREAT SERPL-MCNC: 0.9 MG/DL (ref 0.5–1.4)
DIFFERENTIAL METHOD: ABNORMAL
EOSINOPHIL # BLD AUTO: 0.1 K/UL (ref 0–0.5)
EOSINOPHIL NFR BLD: 2.7 % (ref 0–8)
ERYTHROCYTE [DISTWIDTH] IN BLOOD BY AUTOMATED COUNT: 17.2 % (ref 11.5–14.5)
EST. GFR  (AFRICAN AMERICAN): >60 ML/MIN/1.73 M^2
EST. GFR  (NON AFRICAN AMERICAN): >60 ML/MIN/1.73 M^2
GLUCOSE SERPL-MCNC: 113 MG/DL (ref 70–110)
GLUCOSE UR QL STRIP: NEGATIVE
HCT VFR BLD AUTO: 35 % (ref 37–48.5)
HGB BLD-MCNC: 10.9 G/DL (ref 12–16)
HGB UR QL STRIP: ABNORMAL
HYALINE CASTS #/AREA URNS LPF: 0 /LPF
IMM GRANULOCYTES # BLD AUTO: 0.01 K/UL (ref 0–0.04)
IMM GRANULOCYTES NFR BLD AUTO: 0.3 % (ref 0–0.5)
INR PPP: 0.9 (ref 0.8–1.2)
KETONES UR QL STRIP: NEGATIVE
LEUKOCYTE ESTERASE UR QL STRIP: NEGATIVE
LYMPHOCYTES # BLD AUTO: 1.3 K/UL (ref 1–4.8)
LYMPHOCYTES NFR BLD: 35.8 % (ref 18–48)
MAGNESIUM SERPL-MCNC: 1.9 MG/DL (ref 1.6–2.6)
MCH RBC QN AUTO: 26.2 PG (ref 27–31)
MCHC RBC AUTO-ENTMCNC: 31.1 G/DL (ref 32–36)
MCV RBC AUTO: 84 FL (ref 82–98)
MICROSCOPIC COMMENT: NORMAL
MONOCYTES # BLD AUTO: 0.4 K/UL (ref 0.3–1)
MONOCYTES NFR BLD: 11.7 % (ref 4–15)
NEUTROPHILS # BLD AUTO: 1.8 K/UL (ref 1.8–7.7)
NEUTROPHILS NFR BLD: 49.2 % (ref 38–73)
NITRITE UR QL STRIP: NEGATIVE
NRBC BLD-RTO: 0 /100 WBC
PH UR STRIP: 7 [PH] (ref 5–8)
PHOSPHATE SERPL-MCNC: 2.7 MG/DL (ref 2.7–4.5)
PLATELET # BLD AUTO: 159 K/UL (ref 150–350)
PMV BLD AUTO: 10.4 FL (ref 9.2–12.9)
POTASSIUM SERPL-SCNC: 4 MMOL/L (ref 3.5–5.1)
PROT SERPL-MCNC: 7 G/DL (ref 6–8.4)
PROT UR QL STRIP: ABNORMAL
PROTHROMBIN TIME: 9.9 SEC (ref 9–12.5)
RBC # BLD AUTO: 4.16 M/UL (ref 4–5.4)
RBC #/AREA URNS HPF: 2 /HPF (ref 0–4)
SODIUM SERPL-SCNC: 142 MMOL/L (ref 136–145)
SP GR UR STRIP: 1.02 (ref 1–1.03)
TROPONIN I SERPL DL<=0.01 NG/ML-MCNC: 0.02 NG/ML (ref 0–0.03)
TROPONIN I SERPL DL<=0.01 NG/ML-MCNC: 0.02 NG/ML (ref 0–0.03)
TSH SERPL DL<=0.005 MIU/L-ACNC: 1.23 UIU/ML (ref 0.4–4)
URN SPEC COLLECT METH UR: ABNORMAL
UROBILINOGEN UR STRIP-ACNC: NEGATIVE EU/DL
WBC # BLD AUTO: 3.66 K/UL (ref 3.9–12.7)
WBC #/AREA URNS HPF: 2 /HPF (ref 0–5)

## 2020-01-15 PROCEDURE — 93005 ELECTROCARDIOGRAM TRACING: CPT

## 2020-01-15 PROCEDURE — 84443 ASSAY THYROID STIM HORMONE: CPT

## 2020-01-15 PROCEDURE — 85730 THROMBOPLASTIN TIME PARTIAL: CPT

## 2020-01-15 PROCEDURE — 82553 CREATINE MB FRACTION: CPT | Mod: 91

## 2020-01-15 PROCEDURE — 80053 COMPREHEN METABOLIC PANEL: CPT

## 2020-01-15 PROCEDURE — 99285 EMERGENCY DEPT VISIT HI MDM: CPT | Mod: 25

## 2020-01-15 PROCEDURE — 25000003 PHARM REV CODE 250: Performed by: SURGERY

## 2020-01-15 PROCEDURE — 93010 ELECTROCARDIOGRAM REPORT: CPT | Mod: ,,, | Performed by: INTERNAL MEDICINE

## 2020-01-15 PROCEDURE — 36415 COLL VENOUS BLD VENIPUNCTURE: CPT

## 2020-01-15 PROCEDURE — 83880 ASSAY OF NATRIURETIC PEPTIDE: CPT

## 2020-01-15 PROCEDURE — 84100 ASSAY OF PHOSPHORUS: CPT

## 2020-01-15 PROCEDURE — 81000 URINALYSIS NONAUTO W/SCOPE: CPT

## 2020-01-15 PROCEDURE — 82550 ASSAY OF CK (CPK): CPT | Mod: 91

## 2020-01-15 PROCEDURE — 93010 EKG 12-LEAD: ICD-10-PCS | Mod: ,,, | Performed by: INTERNAL MEDICINE

## 2020-01-15 PROCEDURE — 84484 ASSAY OF TROPONIN QUANT: CPT | Mod: 91

## 2020-01-15 PROCEDURE — 85025 COMPLETE CBC W/AUTO DIFF WBC: CPT

## 2020-01-15 PROCEDURE — 85610 PROTHROMBIN TIME: CPT

## 2020-01-15 PROCEDURE — 83735 ASSAY OF MAGNESIUM: CPT

## 2020-01-15 RX ORDER — CLONIDINE HYDROCHLORIDE 0.1 MG/1
0.2 TABLET ORAL
Status: COMPLETED | OUTPATIENT
Start: 2020-01-15 | End: 2020-01-15

## 2020-01-15 RX ORDER — AMLODIPINE BESYLATE 5 MG/1
5 TABLET ORAL
Status: DISCONTINUED | OUTPATIENT
Start: 2020-01-15 | End: 2020-01-15

## 2020-01-15 RX ADMIN — NITROGLYCERIN 1 INCH: 20 OINTMENT TOPICAL at 02:01

## 2020-01-15 RX ADMIN — CLONIDINE HYDROCHLORIDE 0.2 MG: 0.1 TABLET ORAL at 01:01

## 2020-01-15 NOTE — ED NOTES
Pt reports recent switch in pain medication from norco to tramadol. Reports she has not taken her pain medication in two days.

## 2020-01-15 NOTE — ED PROVIDER NOTES
Encounter Date: 1/15/2020       History     Chief Complaint   Patient presents with    Hypertension     PATIENT IS 69-YEAR-OLD BLACK FEMALE WHO IS TAKING 3 MEDICATIONS FOR HYPERTENSION.  SHE HAS BEEN TAKING MEDICATIONS AS SCHEDULE.  SHE WAS NOTED IN TRIAGE TODAY TO HAVE SYSTOLIC .  SHE LAST SAW HER PRIMARY CARE PROVIDER ABOUT A MONTH AGO, AND HAS FOLLOW-UP TOMORROW TO CONSIDER BLOOD PRESSURE MEDICATION CHANGES.        Review of patient's allergies indicates:   Allergen Reactions    Dye     Sulfa (sulfonamide antibiotics)     Zestril [lisinopril] Swelling     Past Medical History:   Diagnosis Date    Hypertension     Leukemia      History reviewed. No pertinent surgical history.  History reviewed. No pertinent family history.  Social History     Tobacco Use    Smoking status: Never Smoker    Smokeless tobacco: Never Used   Substance Use Topics    Alcohol use: No    Drug use: No     Review of Systems   Constitutional: Negative for fever.   HENT: Negative for congestion, ear pain, rhinorrhea, sore throat and trouble swallowing.    Eyes: Negative for pain.   Respiratory: Negative for cough, shortness of breath and wheezing.    Cardiovascular: Negative for chest pain, palpitations and leg swelling.   Gastrointestinal: Negative for abdominal pain, constipation, diarrhea and nausea.   Genitourinary: Negative for difficulty urinating, dysuria, flank pain, frequency, hematuria and urgency.   Musculoskeletal: Negative for arthralgias, back pain, myalgias and neck pain.   Skin: Negative for rash and wound.   Neurological: Negative for speech difficulty, weakness and headaches.   Hematological: Does not bruise/bleed easily.       Physical Exam     Initial Vitals   BP Pulse Resp Temp SpO2   01/15/20 1258 01/15/20 1255 01/15/20 1255 01/15/20 1255 01/15/20 1255   (!) 233/101 (!) 58 18 96.8 °F (36 °C) 100 %      MAP       --                Physical Exam    Nursing note and vitals reviewed.  Constitutional: She  appears well-developed and well-nourished. No distress.   HENT:   Head: Normocephalic and atraumatic.   Nose: Nose normal.   Mouth/Throat: Oropharynx is clear and moist.   Eyes: Conjunctivae and EOM are normal. Pupils are equal, round, and reactive to light.   Neck: Normal range of motion. Neck supple.   Cardiovascular: Normal rate, regular rhythm, normal heart sounds and intact distal pulses.   Pulmonary/Chest: Breath sounds normal. No respiratory distress. She has no wheezes. She has no rhonchi. She has no rales.   Abdominal: Soft. Bowel sounds are normal. She exhibits no distension. There is no tenderness.   Musculoskeletal: Normal range of motion.   Neurological: She is alert and oriented to person, place, and time. She has normal strength.   Skin: Skin is warm and dry.   Psychiatric: She has a normal mood and affect. Thought content normal.         ED Course   Procedures  Labs Reviewed   COMPREHENSIVE METABOLIC PANEL - Abnormal; Notable for the following components:       Result Value    Glucose 113 (*)     Alkaline Phosphatase 43 (*)     All other components within normal limits   CBC W/ AUTO DIFFERENTIAL - Abnormal; Notable for the following components:    WBC 3.66 (*)     Hemoglobin 10.9 (*)     Hematocrit 35.0 (*)     Mean Corpuscular Hemoglobin 26.2 (*)     Mean Corpuscular Hemoglobin Conc 31.1 (*)     RDW 17.2 (*)     All other components within normal limits   URINALYSIS, REFLEX TO URINE CULTURE - Abnormal; Notable for the following components:    Protein, UA 2+ (*)     Occult Blood UA Trace (*)     All other components within normal limits    Narrative:     Preferred Collection Type->Urine, Clean Catch   TROPONIN I   CK   CK-MB   B-TYPE NATRIURETIC PEPTIDE   PROTIME-INR   APTT   MAGNESIUM   TSH   PHOSPHORUS   URINALYSIS MICROSCOPIC    Narrative:     Preferred Collection Type->Urine, Clean Catch   TROPONIN I   CK   CK-MB          Imaging Results          X-Ray Chest 1 View (Final result)  Result time  01/15/20 19:03:30    Final result by Ion Lopez MD (01/15/20 19:03:30)                 Impression:      No acute process.      Electronically signed by: Ion Lopez MD  Date:    01/15/2020  Time:    19:03             Narrative:    EXAMINATION:  XR CHEST 1 VIEW    CLINICAL HISTORY:  Chest pain    FINDINGS:  Comparison study 12/25/2018.  Normal size heart.  The lungs are clear.  Moderate sigmoidal scoliosis thoracolumbar spine.  Advanced degenerative right shoulder and AC joint.                                                                 Clinical Impression:       ICD-10-CM ICD-9-CM   1. Hypertension, unspecified type I10 401.9   2. Fatigue R53.83 780.79         Disposition:   Disposition: Discharged  Condition: Stable                     Giovanni Gaspar Jr., MD  01/15/20 0494

## 2020-01-16 NOTE — ED NOTES
Patient ambulatory at this time. Stable for discharge. Patient to follow up to with primary and oncology doctor tomorrow.

## 2023-07-16 ENCOUNTER — HOSPITAL ENCOUNTER (OUTPATIENT)
Facility: HOSPITAL | Age: 73
Discharge: HOME HEALTH | End: 2023-07-18
Attending: SURGERY | Admitting: STUDENT IN AN ORGANIZED HEALTH CARE EDUCATION/TRAINING PROGRAM
Payer: MEDICARE

## 2023-07-16 DIAGNOSIS — C94.81 OTHER SPECIFIED LEUKEMIA IN REMISSION: ICD-10-CM

## 2023-07-16 DIAGNOSIS — I25.10 CARDIOVASCULAR DISEASE: ICD-10-CM

## 2023-07-16 DIAGNOSIS — N30.00 ACUTE CYSTITIS WITHOUT HEMATURIA: ICD-10-CM

## 2023-07-16 DIAGNOSIS — I16.1 HYPERTENSIVE EMERGENCY WITHOUT CONGESTIVE HEART FAILURE: ICD-10-CM

## 2023-07-16 DIAGNOSIS — I16.1 HYPERTENSIVE EMERGENCY: Primary | ICD-10-CM

## 2023-07-16 DIAGNOSIS — R79.89 ELEVATED TROPONIN: ICD-10-CM

## 2023-07-16 LAB
ALBUMIN SERPL BCP-MCNC: 3.8 G/DL (ref 3.5–5.2)
ALP SERPL-CCNC: 46 U/L (ref 55–135)
ALT SERPL W/O P-5'-P-CCNC: 12 U/L (ref 10–44)
ANION GAP SERPL CALC-SCNC: 10 MMOL/L (ref 8–16)
AST SERPL-CCNC: 15 U/L (ref 10–40)
BACTERIA #/AREA URNS HPF: ABNORMAL /HPF
BASOPHILS # BLD AUTO: 0.01 K/UL (ref 0–0.2)
BASOPHILS NFR BLD: 0.3 % (ref 0–1.9)
BILIRUB SERPL-MCNC: 0.3 MG/DL (ref 0.1–1)
BILIRUB UR QL STRIP: NEGATIVE
BNP SERPL-MCNC: <10 PG/ML (ref 0–99)
BUN SERPL-MCNC: 16 MG/DL (ref 8–23)
CALCIUM SERPL-MCNC: 9.4 MG/DL (ref 8.7–10.5)
CHLORIDE SERPL-SCNC: 108 MMOL/L (ref 95–110)
CK SERPL-CCNC: 119 U/L (ref 20–180)
CLARITY UR: CLEAR
CO2 SERPL-SCNC: 24 MMOL/L (ref 23–29)
COLOR UR: YELLOW
CREAT SERPL-MCNC: 1 MG/DL (ref 0.5–1.4)
DIFFERENTIAL METHOD: ABNORMAL
EOSINOPHIL # BLD AUTO: 0.1 K/UL (ref 0–0.5)
EOSINOPHIL NFR BLD: 1.3 % (ref 0–8)
ERYTHROCYTE [DISTWIDTH] IN BLOOD BY AUTOMATED COUNT: 15.9 % (ref 11.5–14.5)
EST. GFR  (NO RACE VARIABLE): 59 ML/MIN/1.73 M^2
GLUCOSE SERPL-MCNC: 96 MG/DL (ref 70–110)
GLUCOSE UR QL STRIP: NEGATIVE
HCT VFR BLD AUTO: 37.5 % (ref 37–48.5)
HGB BLD-MCNC: 11.8 G/DL (ref 12–16)
HGB UR QL STRIP: NEGATIVE
HYALINE CASTS #/AREA URNS LPF: 0 /LPF
IMM GRANULOCYTES # BLD AUTO: 0.01 K/UL (ref 0–0.04)
IMM GRANULOCYTES NFR BLD AUTO: 0.3 % (ref 0–0.5)
KETONES UR QL STRIP: NEGATIVE
LEUKOCYTE ESTERASE UR QL STRIP: ABNORMAL
LYMPHOCYTES # BLD AUTO: 1.3 K/UL (ref 1–4.8)
LYMPHOCYTES NFR BLD: 35.6 % (ref 18–48)
MCH RBC QN AUTO: 27.1 PG (ref 27–31)
MCHC RBC AUTO-ENTMCNC: 31.5 G/DL (ref 32–36)
MCV RBC AUTO: 86 FL (ref 82–98)
MICROSCOPIC COMMENT: ABNORMAL
MONOCYTES # BLD AUTO: 0.4 K/UL (ref 0.3–1)
MONOCYTES NFR BLD: 11.1 % (ref 4–15)
NEUTROPHILS # BLD AUTO: 1.9 K/UL (ref 1.8–7.7)
NEUTROPHILS NFR BLD: 51.4 % (ref 38–73)
NITRITE UR QL STRIP: NEGATIVE
NRBC BLD-RTO: 0 /100 WBC
PH UR STRIP: 7 [PH] (ref 5–8)
PLATELET # BLD AUTO: 191 K/UL (ref 150–450)
PMV BLD AUTO: 10.1 FL (ref 9.2–12.9)
POTASSIUM SERPL-SCNC: 3.9 MMOL/L (ref 3.5–5.1)
PROT SERPL-MCNC: 6.9 G/DL (ref 6–8.4)
PROT UR QL STRIP: ABNORMAL
RBC # BLD AUTO: 4.35 M/UL (ref 4–5.4)
RBC #/AREA URNS HPF: 1 /HPF (ref 0–4)
SODIUM SERPL-SCNC: 142 MMOL/L (ref 136–145)
SP GR UR STRIP: 1.02 (ref 1–1.03)
SQUAMOUS #/AREA URNS HPF: 10 /HPF
TROPONIN I SERPL DL<=0.01 NG/ML-MCNC: 0.02 NG/ML (ref 0–0.03)
TROPONIN I SERPL DL<=0.01 NG/ML-MCNC: 0.03 NG/ML (ref 0–0.03)
URN SPEC COLLECT METH UR: ABNORMAL
UROBILINOGEN UR STRIP-ACNC: NEGATIVE EU/DL
WBC # BLD AUTO: 3.71 K/UL (ref 3.9–12.7)
WBC #/AREA URNS HPF: 11 /HPF (ref 0–5)

## 2023-07-16 PROCEDURE — 82550 ASSAY OF CK (CPK): CPT | Performed by: SURGERY

## 2023-07-16 PROCEDURE — 96376 TX/PRO/DX INJ SAME DRUG ADON: CPT

## 2023-07-16 PROCEDURE — 93005 ELECTROCARDIOGRAM TRACING: CPT

## 2023-07-16 PROCEDURE — 63600175 PHARM REV CODE 636 W HCPCS: Performed by: SURGERY

## 2023-07-16 PROCEDURE — 99285 EMERGENCY DEPT VISIT HI MDM: CPT | Mod: 25

## 2023-07-16 PROCEDURE — 80053 COMPREHEN METABOLIC PANEL: CPT | Performed by: SURGERY

## 2023-07-16 PROCEDURE — 25000003 PHARM REV CODE 250: Performed by: SURGERY

## 2023-07-16 PROCEDURE — 93010 ELECTROCARDIOGRAM REPORT: CPT | Mod: ,,, | Performed by: INTERNAL MEDICINE

## 2023-07-16 PROCEDURE — 84484 ASSAY OF TROPONIN QUANT: CPT | Mod: 91 | Performed by: SURGERY

## 2023-07-16 PROCEDURE — 96374 THER/PROPH/DIAG INJ IV PUSH: CPT | Mod: 59

## 2023-07-16 PROCEDURE — 87086 URINE CULTURE/COLONY COUNT: CPT | Performed by: SURGERY

## 2023-07-16 PROCEDURE — 36415 COLL VENOUS BLD VENIPUNCTURE: CPT | Performed by: SURGERY

## 2023-07-16 PROCEDURE — 81000 URINALYSIS NONAUTO W/SCOPE: CPT | Performed by: SURGERY

## 2023-07-16 PROCEDURE — 83880 ASSAY OF NATRIURETIC PEPTIDE: CPT | Performed by: SURGERY

## 2023-07-16 PROCEDURE — 85025 COMPLETE CBC W/AUTO DIFF WBC: CPT | Performed by: SURGERY

## 2023-07-16 PROCEDURE — 93010 EKG 12-LEAD: ICD-10-PCS | Mod: ,,, | Performed by: INTERNAL MEDICINE

## 2023-07-16 RX ORDER — HYDRALAZINE HYDROCHLORIDE 20 MG/ML
10 INJECTION INTRAMUSCULAR; INTRAVENOUS
Status: COMPLETED | OUTPATIENT
Start: 2023-07-16 | End: 2023-07-16

## 2023-07-16 RX ORDER — HYDRALAZINE HYDROCHLORIDE 20 MG/ML
20 INJECTION INTRAMUSCULAR; INTRAVENOUS
Status: COMPLETED | OUTPATIENT
Start: 2023-07-16 | End: 2023-07-16

## 2023-07-16 RX ORDER — HYDRALAZINE HYDROCHLORIDE 20 MG/ML
20 INJECTION INTRAMUSCULAR; INTRAVENOUS
Status: DISCONTINUED | OUTPATIENT
Start: 2023-07-16 | End: 2023-07-16

## 2023-07-16 RX ORDER — NIFEDIPINE 30 MG/1
TABLET, EXTENDED RELEASE ORAL
Status: COMPLETED
Start: 2023-07-16 | End: 2023-07-16

## 2023-07-16 RX ADMIN — NIFEDIPINE 90 MG: 60 TABLET, FILM COATED, EXTENDED RELEASE ORAL at 09:07

## 2023-07-16 RX ADMIN — HYDRALAZINE HYDROCHLORIDE 20 MG: 20 INJECTION, SOLUTION INTRAMUSCULAR; INTRAVENOUS at 08:07

## 2023-07-16 RX ADMIN — HYDRALAZINE HYDROCHLORIDE 20 MG: 20 INJECTION, SOLUTION INTRAMUSCULAR; INTRAVENOUS at 09:07

## 2023-07-16 RX ADMIN — HYDRALAZINE HYDROCHLORIDE 10 MG: 20 INJECTION, SOLUTION INTRAMUSCULAR; INTRAVENOUS at 11:07

## 2023-07-16 NOTE — Clinical Note
Diagnosis: Hypertensive emergency [013802]   Admitting Provider:: REENA VILLALTA [[id number]]   Future Attending Provider: REENA VILLALTA [[id number]]   Reason for IP Medical Treatment  (Clinical interventions that can only be accomplished in the IP setting? ) :: IV Bp rx   I certify that Inpatient services for greater than or equal to 2 midnights are medically necessary:: Yes   Plans for Post-Acute care--if anticipated (pick the single best option):: A. No post acute care anticipated at this time

## 2023-07-17 PROBLEM — C95.11: Status: ACTIVE | Noted: 2023-07-17

## 2023-07-17 PROBLEM — R79.89 ELEVATED TROPONIN: Status: ACTIVE | Noted: 2023-07-17

## 2023-07-17 PROBLEM — I16.1 HYPERTENSIVE EMERGENCY WITHOUT CONGESTIVE HEART FAILURE: Status: ACTIVE | Noted: 2023-07-17

## 2023-07-17 PROBLEM — G57.93 NEUROPATHY OF BOTH FEET: Status: ACTIVE | Noted: 2023-07-17

## 2023-07-17 LAB
ALBUMIN SERPL BCP-MCNC: 4.2 G/DL (ref 3.5–5.2)
ALP SERPL-CCNC: 53 U/L (ref 55–135)
ALT SERPL W/O P-5'-P-CCNC: 9 U/L (ref 10–44)
ANION GAP SERPL CALC-SCNC: 13 MMOL/L (ref 8–16)
AST SERPL-CCNC: 18 U/L (ref 10–40)
BASOPHILS # BLD AUTO: 0.01 K/UL (ref 0–0.2)
BASOPHILS NFR BLD: 0.1 % (ref 0–1.9)
BILIRUB SERPL-MCNC: 0.3 MG/DL (ref 0.1–1)
BUN SERPL-MCNC: 14 MG/DL (ref 8–23)
CALCIUM SERPL-MCNC: 10.2 MG/DL (ref 8.7–10.5)
CHLORIDE SERPL-SCNC: 107 MMOL/L (ref 95–110)
CO2 SERPL-SCNC: 21 MMOL/L (ref 23–29)
CREAT SERPL-MCNC: 0.8 MG/DL (ref 0.5–1.4)
DIFFERENTIAL METHOD: ABNORMAL
EOSINOPHIL # BLD AUTO: 0 K/UL (ref 0–0.5)
EOSINOPHIL NFR BLD: 0.5 % (ref 0–8)
ERYTHROCYTE [DISTWIDTH] IN BLOOD BY AUTOMATED COUNT: 15.9 % (ref 11.5–14.5)
EST. GFR  (NO RACE VARIABLE): >60 ML/MIN/1.73 M^2
GLUCOSE SERPL-MCNC: 103 MG/DL (ref 70–110)
HCT VFR BLD AUTO: 39.7 % (ref 37–48.5)
HGB BLD-MCNC: 12.6 G/DL (ref 12–16)
IMM GRANULOCYTES # BLD AUTO: 0.02 K/UL (ref 0–0.04)
IMM GRANULOCYTES NFR BLD AUTO: 0.2 % (ref 0–0.5)
LYMPHOCYTES # BLD AUTO: 1.3 K/UL (ref 1–4.8)
LYMPHOCYTES NFR BLD: 16 % (ref 18–48)
MCH RBC QN AUTO: 27 PG (ref 27–31)
MCHC RBC AUTO-ENTMCNC: 31.7 G/DL (ref 32–36)
MCV RBC AUTO: 85 FL (ref 82–98)
MONOCYTES # BLD AUTO: 0.6 K/UL (ref 0.3–1)
MONOCYTES NFR BLD: 7.9 % (ref 4–15)
NEUTROPHILS # BLD AUTO: 6 K/UL (ref 1.8–7.7)
NEUTROPHILS NFR BLD: 75.3 % (ref 38–73)
NRBC BLD-RTO: 0 /100 WBC
PLATELET # BLD AUTO: 210 K/UL (ref 150–450)
PMV BLD AUTO: 10.6 FL (ref 9.2–12.9)
POTASSIUM SERPL-SCNC: 3.5 MMOL/L (ref 3.5–5.1)
PROT SERPL-MCNC: 7.9 G/DL (ref 6–8.4)
RBC # BLD AUTO: 4.66 M/UL (ref 4–5.4)
SODIUM SERPL-SCNC: 141 MMOL/L (ref 136–145)
TROPONIN I SERPL DL<=0.01 NG/ML-MCNC: 0.04 NG/ML (ref 0–0.03)
TROPONIN I SERPL DL<=0.01 NG/ML-MCNC: 0.04 NG/ML (ref 0–0.03)
TROPONIN I SERPL DL<=0.01 NG/ML-MCNC: 0.05 NG/ML (ref 0–0.03)
TROPONIN I SERPL DL<=0.01 NG/ML-MCNC: 0.05 NG/ML (ref 0–0.03)
WBC # BLD AUTO: 8.01 K/UL (ref 3.9–12.7)

## 2023-07-17 PROCEDURE — G0378 HOSPITAL OBSERVATION PER HR: HCPCS

## 2023-07-17 PROCEDURE — 96366 THER/PROPH/DIAG IV INF ADDON: CPT

## 2023-07-17 PROCEDURE — 25000003 PHARM REV CODE 250: Performed by: PHYSICIAN ASSISTANT

## 2023-07-17 PROCEDURE — 84484 ASSAY OF TROPONIN QUANT: CPT | Performed by: SURGERY

## 2023-07-17 PROCEDURE — 99900035 HC TECH TIME PER 15 MIN (STAT)

## 2023-07-17 PROCEDURE — 93005 ELECTROCARDIOGRAM TRACING: CPT

## 2023-07-17 PROCEDURE — 25000003 PHARM REV CODE 250: Performed by: SURGERY

## 2023-07-17 PROCEDURE — 25000003 PHARM REV CODE 250: Performed by: INTERNAL MEDICINE

## 2023-07-17 PROCEDURE — 94761 N-INVAS EAR/PLS OXIMETRY MLT: CPT

## 2023-07-17 PROCEDURE — 99291 PR CRITICAL CARE, E/M 30-74 MINUTES: ICD-10-PCS | Mod: ,,, | Performed by: PHYSICIAN ASSISTANT

## 2023-07-17 PROCEDURE — 85025 COMPLETE CBC W/AUTO DIFF WBC: CPT | Performed by: SURGERY

## 2023-07-17 PROCEDURE — 36415 COLL VENOUS BLD VENIPUNCTURE: CPT | Performed by: PHYSICIAN ASSISTANT

## 2023-07-17 PROCEDURE — 96365 THER/PROPH/DIAG IV INF INIT: CPT

## 2023-07-17 PROCEDURE — 84484 ASSAY OF TROPONIN QUANT: CPT | Mod: 91 | Performed by: PHYSICIAN ASSISTANT

## 2023-07-17 PROCEDURE — 36415 COLL VENOUS BLD VENIPUNCTURE: CPT | Performed by: SURGERY

## 2023-07-17 PROCEDURE — 97165 OT EVAL LOW COMPLEX 30 MIN: CPT

## 2023-07-17 PROCEDURE — 25000003 PHARM REV CODE 250: Performed by: STUDENT IN AN ORGANIZED HEALTH CARE EDUCATION/TRAINING PROGRAM

## 2023-07-17 PROCEDURE — 99291 CRITICAL CARE FIRST HOUR: CPT | Mod: ,,, | Performed by: PHYSICIAN ASSISTANT

## 2023-07-17 PROCEDURE — 99900031 HC PATIENT EDUCATION (STAT)

## 2023-07-17 PROCEDURE — 93010 ELECTROCARDIOGRAM REPORT: CPT | Mod: ,,, | Performed by: INTERNAL MEDICINE

## 2023-07-17 PROCEDURE — 93010 EKG 12-LEAD: ICD-10-PCS | Mod: ,,, | Performed by: INTERNAL MEDICINE

## 2023-07-17 PROCEDURE — 20000000 HC ICU ROOM

## 2023-07-17 PROCEDURE — 80053 COMPREHEN METABOLIC PANEL: CPT | Performed by: SURGERY

## 2023-07-17 RX ORDER — NIFEDIPINE 30 MG/1
90 TABLET, EXTENDED RELEASE ORAL DAILY
Status: DISCONTINUED | OUTPATIENT
Start: 2023-07-17 | End: 2023-07-18 | Stop reason: HOSPADM

## 2023-07-17 RX ORDER — POLYETHYLENE GLYCOL 3350 17 G/17G
17 POWDER, FOR SOLUTION ORAL DAILY
Status: DISCONTINUED | OUTPATIENT
Start: 2023-07-17 | End: 2023-07-18 | Stop reason: HOSPADM

## 2023-07-17 RX ORDER — AMLODIPINE BESYLATE 5 MG/1
10 TABLET ORAL DAILY
Status: DISCONTINUED | OUTPATIENT
Start: 2023-07-17 | End: 2023-07-17

## 2023-07-17 RX ORDER — HYDRALAZINE HYDROCHLORIDE 50 MG/1
50 TABLET, FILM COATED ORAL EVERY 8 HOURS
Status: DISCONTINUED | OUTPATIENT
Start: 2023-07-17 | End: 2023-07-17

## 2023-07-17 RX ORDER — SODIUM CHLORIDE 0.9 % (FLUSH) 0.9 %
10 SYRINGE (ML) INJECTION
Status: DISCONTINUED | OUTPATIENT
Start: 2023-07-17 | End: 2023-07-18 | Stop reason: HOSPADM

## 2023-07-17 RX ORDER — GABAPENTIN 100 MG/1
100 CAPSULE ORAL DAILY
Status: DISCONTINUED | OUTPATIENT
Start: 2023-07-17 | End: 2023-07-18 | Stop reason: HOSPADM

## 2023-07-17 RX ORDER — MUPIROCIN 20 MG/G
OINTMENT TOPICAL 2 TIMES DAILY
Status: DISCONTINUED | OUTPATIENT
Start: 2023-07-17 | End: 2023-07-18 | Stop reason: HOSPADM

## 2023-07-17 RX ORDER — TALC
6 POWDER (GRAM) TOPICAL NIGHTLY PRN
Status: DISCONTINUED | OUTPATIENT
Start: 2023-07-17 | End: 2023-07-18 | Stop reason: HOSPADM

## 2023-07-17 RX ORDER — HYDRALAZINE HYDROCHLORIDE 20 MG/ML
10 INJECTION INTRAMUSCULAR; INTRAVENOUS EVERY 6 HOURS PRN
Status: DISCONTINUED | OUTPATIENT
Start: 2023-07-17 | End: 2023-07-18 | Stop reason: HOSPADM

## 2023-07-17 RX ORDER — ACETAMINOPHEN 325 MG/1
650 TABLET ORAL EVERY 8 HOURS PRN
Status: DISCONTINUED | OUTPATIENT
Start: 2023-07-17 | End: 2023-07-18 | Stop reason: HOSPADM

## 2023-07-17 RX ORDER — HYDROCHLOROTHIAZIDE 12.5 MG/1
12.5 TABLET ORAL DAILY
Status: DISCONTINUED | OUTPATIENT
Start: 2023-07-17 | End: 2023-07-18 | Stop reason: HOSPADM

## 2023-07-17 RX ORDER — ONDANSETRON 2 MG/ML
4 INJECTION INTRAMUSCULAR; INTRAVENOUS EVERY 8 HOURS PRN
Status: DISCONTINUED | OUTPATIENT
Start: 2023-07-17 | End: 2023-07-18 | Stop reason: HOSPADM

## 2023-07-17 RX ORDER — CLONIDINE HYDROCHLORIDE 0.1 MG/1
0.1 TABLET ORAL 2 TIMES DAILY
Status: DISCONTINUED | OUTPATIENT
Start: 2023-07-17 | End: 2023-07-18 | Stop reason: HOSPADM

## 2023-07-17 RX ORDER — NICARDIPINE HYDROCHLORIDE 0.2 MG/ML
0-15 INJECTION INTRAVENOUS CONTINUOUS
Status: DISCONTINUED | OUTPATIENT
Start: 2023-07-17 | End: 2023-07-17

## 2023-07-17 RX ORDER — ATORVASTATIN CALCIUM 20 MG/1
20 TABLET, FILM COATED ORAL DAILY
Status: DISCONTINUED | OUTPATIENT
Start: 2023-07-17 | End: 2023-07-18 | Stop reason: HOSPADM

## 2023-07-17 RX ORDER — HYDRALAZINE HYDROCHLORIDE 20 MG/ML
10 INJECTION INTRAMUSCULAR; INTRAVENOUS EVERY 6 HOURS PRN
Status: DISCONTINUED | OUTPATIENT
Start: 2023-07-17 | End: 2023-07-17

## 2023-07-17 RX ORDER — HYDROCHLOROTHIAZIDE 12.5 MG/1
12.5 TABLET ORAL DAILY
Status: DISCONTINUED | OUTPATIENT
Start: 2023-07-17 | End: 2023-07-17

## 2023-07-17 RX ORDER — HYDRALAZINE HYDROCHLORIDE 50 MG/1
50 TABLET, FILM COATED ORAL EVERY 8 HOURS
Status: DISCONTINUED | OUTPATIENT
Start: 2023-07-17 | End: 2023-07-18 | Stop reason: HOSPADM

## 2023-07-17 RX ORDER — LATANOPROST 50 UG/ML
1 SOLUTION/ DROPS OPHTHALMIC NIGHTLY
Status: DISCONTINUED | OUTPATIENT
Start: 2023-07-17 | End: 2023-07-18 | Stop reason: HOSPADM

## 2023-07-17 RX ADMIN — MUPIROCIN: 20 OINTMENT TOPICAL at 08:07

## 2023-07-17 RX ADMIN — ACETAMINOPHEN 650 MG: 325 TABLET ORAL at 04:07

## 2023-07-17 RX ADMIN — HYDRALAZINE HYDROCHLORIDE 50 MG: 50 TABLET, FILM COATED ORAL at 10:07

## 2023-07-17 RX ADMIN — CLONIDINE HYDROCHLORIDE 0.1 MG: 0.1 TABLET ORAL at 08:07

## 2023-07-17 RX ADMIN — HYDRALAZINE HYDROCHLORIDE 50 MG: 50 TABLET, FILM COATED ORAL at 05:07

## 2023-07-17 RX ADMIN — LATANOPROST 1 DROP: 50 SOLUTION OPHTHALMIC at 08:07

## 2023-07-17 RX ADMIN — HYDROCHLOROTHIAZIDE 12.5 MG: 12.5 TABLET ORAL at 05:07

## 2023-07-17 RX ADMIN — NICARDIPINE HYDROCHLORIDE 5 MG/HR: 0.2 INJECTION, SOLUTION INTRAVENOUS at 03:07

## 2023-07-17 RX ADMIN — GABAPENTIN 100 MG: 100 CAPSULE ORAL at 08:07

## 2023-07-17 RX ADMIN — HYDRALAZINE HYDROCHLORIDE 50 MG: 50 TABLET, FILM COATED ORAL at 01:07

## 2023-07-17 RX ADMIN — NIFEDIPINE 90 MG: 30 TABLET, FILM COATED, EXTENDED RELEASE ORAL at 08:07

## 2023-07-17 RX ADMIN — POLYETHYLENE GLYCOL 3350 17 G: 17 POWDER, FOR SOLUTION ORAL at 10:07

## 2023-07-17 RX ADMIN — ATORVASTATIN CALCIUM 20 MG: 20 TABLET, FILM COATED ORAL at 08:07

## 2023-07-17 NOTE — PLAN OF CARE
[facility] - Intensive Care  Initial Discharge Assessment       Primary Care Provider: Vicky Barahona MD    Admission Diagnosis: Elevated troponin [R77.8]  Acute cystitis without hematuria [N30.00]  Hypertensive emergency [I16.1]    Admission Date: 7/16/2023  Expected Discharge Date:     Transition of Care Barriers: None    Payor: [facility] MCARE / Plan: [facility] DUAL COMPLETE HMO SNP / Product Type: Medicare Advantage /     Extended Emergency Contact Information  Primary Emergency Contact: Ann Mejia   [place]  Mobile Phone: [phone]  Relation: Daughter    Discharge Plan A: Home with family, Home Health  Discharge Plan B: Home with family, Home Health      [facility] [address]  [address]  [facility] [place] [facility] [address]  Phone: [phone] Fax: [phone]      Initial Assessment (most recent)       Adult Discharge Assessment - 07/17/23 1106          Discharge Assessment    Assessment Type Discharge Planning Assessment     Confirmed/corrected address, phone number and insurance Yes     Confirmed Demographics Correct on Facesheet     Source of Information patient     Communicated OLE with patient/caregiver Date not available/Unable to determine     Reason For Admission Hypertensive urgency     People in Home grandchild(padma)     Facility Arrived From: Home     Do you expect to return to your current living situation? Yes     Do you have help at home or someone to help you manage your care at home? Yes     Who are your caregiver(s) and their phone number(s)? Ann Mejia (Daughter) [phone]     Prior to hospitilization cognitive status: Alert/Oriented     Current cognitive status: Alert/Oriented     Walking or Climbing Stairs ambulation difficulty, requires equipment     Equipment Currently Used at Home rollator;shower chair;bedside commode;cane, straight;other (see comments)   BP Cuff    Readmission within  30 days? No     Patient currently being followed by outpatient case management? No     Do you currently have service(s) that help you manage your care at home? No     Do you take prescription medications? Yes     Do you have prescription coverage? Yes     Coverage [facility] Dual Complete     Do you have any problems affording any of your prescribed medications? No     How do you get to doctors appointments? family or friend will provide     Are you on dialysis? No     Discharge Plan A Home with family;Home Health     Discharge Plan B Home with family;Home Health     DME Needed Upon Discharge  none     Discharge Plan discussed with: Patient     Transition of Care Barriers None                      Discharge assessment completed with patient. Patient agreeable to home health at discharge. She has had home health in the past but unsure of agency. There are no other identified post-acute care needs at this time. SW to remain available.

## 2023-07-17 NOTE — SUBJECTIVE & OBJECTIVE
Past Medical History:   Diagnosis Date    Hypertension     Leukemia        Past Surgical History:   Procedure Laterality Date    TONSILLECTOMY      WRIST FRACTURE SURGERY Left 07/01/1994       Review of patient's allergies indicates:   Allergen Reactions    Dye     Sulfa (sulfonamide antibiotics)     Zestril [lisinopril] Swelling       No current facility-administered medications on file prior to encounter.     Current Outpatient Medications on File Prior to Encounter   Medication Sig    amlodipine (NORVASC) 10 MG tablet Take 1 tablet (10 mg total) by mouth once daily.    atorvastatin (LIPITOR) 20 MG tablet Take 20 mg by mouth once daily.    atorvastatin (LIPITOR) 40 MG tablet Take 1 tablet orally once a day.    atorvastatin (LIPITOR) 40 MG tablet Take 1 tablet orally once a day.    COURTESY GHAZAL, APPT. NEEDED    cloNIDine (CATAPRES) 0.1 MG tablet Take one tablet by mouth twice a day    cloNIDine (CATAPRES) 0.1 MG tablet TAKE ONE TABLET BY MOUTH TWICE DAILY    furosemide (LASIX) 20 MG tablet Take 20 mg by mouth daily as needed.    furosemide (LASIX) 20 MG tablet Take 1 tablet Once a day Orally 30 days    hydrALAZINE (APRESOLINE) 50 MG tablet Take one tablet by mouth three times a day    hydrALAZINE (APRESOLINE) 50 MG tablet Take one tablet by mouth three times a day with food    hydrochlorothiazide (HYDRODIURIL) 12.5 MG Tab Take 12.5 mg by mouth once daily.    HYDROcodone-acetaminophen (NORCO)  mg per tablet TAKE 1 TABLET BY MOUTH EVERY DAY    HYDROcodone-acetaminophen (NORCO)  mg per tablet Take 1 table by mouth every day.    ICLUSIG 30 mg Tab Take 1 tablet by mouth daily as needed.    latanoprost 0.005 % ophthalmic solution instill 1 drop into affected eye(s) once daily in the evening    meclizine (ANTIVERT) 12.5 mg tablet Take 1 tablet (12.5 mg total) by mouth 3 (three) times daily as needed for Dizziness.    meclizine (ANTIVERT) 25 mg tablet Take 1 tablet by mouth 2 times a day.    methylPREDNISolone  (MEDROL DOSEPACK) 4 mg tablet Pack as directed    metoprolol tartrate (LOPRESSOR) 50 MG tablet Take 50 mg by mouth 2 (two) times daily.    NIFEdipine (PROCARDIA-XL) 30 MG (OSM) 24 hr tablet Take one tablet by mouth twice a day    NIFEdipine (PROCARDIA-XL) 30 MG (OSM) 24 hr tablet Take one tablet by mouth twice a day    NIFEdipine (PROCARDIA-XL) 60 MG (OSM) 24 hr tablet TAKE 1 TABLET BY MOUTH DAILY.    NIFEdipine (PROCARDIA-XL) 90 MG (OSM) 24 hr tablet Take one tablet by mouth once a day    NIFEdipine (PROCARDIA-XL) 90 MG (OSM) 24 hr tablet TAKE ONE TABLET BY MOUTH ONCE A DAY ON EMPTY STOMACH    ondansetron (ZOFRAN-ODT) 4 MG TbDL Take 1 tablet (4 mg total) by mouth every 8 (eight) hours as needed (nausea).    ponatinib (ICLUSIG) 15 mg Tab Take by mouth.    potassium chloride SA (K-DUR,KLOR-CON) 10 MEQ tablet Take 10 mEq by mouth 2 (two) times daily.     Family History       Problem Relation (Age of Onset)    Cancer Mother    Heart disease Maternal Uncle    Hypertension Father, Sister, Daughter, Son          Tobacco Use    Smoking status: Never    Smokeless tobacco: Never   Substance and Sexual Activity    Alcohol use: Not on file    Drug use: No    Sexual activity: Not Currently     Review of Systems   Constitutional:  Negative for chills and fever.   HENT:  Negative for ear discharge and ear pain.    Eyes:  Negative for pain and discharge.   Respiratory:  Negative for cough and shortness of breath.    Cardiovascular:  Negative for chest pain and leg swelling.   Gastrointestinal:  Negative for nausea and vomiting.   Endocrine: Negative for cold intolerance and heat intolerance.   Genitourinary:  Negative for difficulty urinating and dysuria.   Musculoskeletal:  Positive for arthralgias. Negative for joint swelling and myalgias.   Skin:  Negative for rash and wound.   Neurological:  Negative for dizziness and headaches.   Psychiatric/Behavioral:  Negative for agitation and confusion.    Objective:     Vital Signs  (Most Recent):  Temp: 98.2 °F (36.8 °C) (07/17/23 0434)  Pulse: 80 (07/17/23 0704)  Resp: 12 (07/17/23 0704)  BP: (!) 156/73 (07/17/23 0704)  SpO2: 97 % (07/17/23 0704) Vital Signs (24h Range):  Temp:  [97.3 °F (36.3 °C)-98.2 °F (36.8 °C)] 98.2 °F (36.8 °C)  Pulse:  [63-97] 80  Resp:  [12-38] 12  SpO2:  [95 %-100 %] 97 %  BP: (150-246)/() 156/73     Weight: 79 kg (174 lb 2.6 oz)  Body mass index is 29.9 kg/m².     Physical Exam  Constitutional:       General: She is not in acute distress.  HENT:      Head: Normocephalic and atraumatic.   Eyes:      General:         Right eye: No discharge.         Left eye: No discharge.   Cardiovascular:      Rate and Rhythm: Normal rate and regular rhythm.   Pulmonary:      Effort: Pulmonary effort is normal.      Breath sounds: Normal breath sounds.   Abdominal:      General: There is no distension.      Tenderness: There is no abdominal tenderness.   Musculoskeletal:         General: No swelling or tenderness.      Cervical back: Neck supple. No tenderness.   Skin:     General: Skin is warm and dry.   Neurological:      General: No focal deficit present.      Mental Status: She is alert and oriented to person, place, and time.   Psychiatric:         Mood and Affect: Mood normal.         Behavior: Behavior normal.              Significant Labs: A1C: No results for input(s): HGBA1C in the last 4320 hours.  ABGs: No results for input(s): PH, PCO2, HCO3, POCSATURATED, BE, TOTALHB, COHB, METHB, O2HB, POCFIO2, PO2 in the last 48 hours.  Bilirubin:   Recent Labs   Lab 07/16/23 2050 07/17/23  0511   BILITOT 0.3 0.3     Blood Culture: No results for input(s): LABBLOO in the last 48 hours.  BMP:   Recent Labs   Lab 07/17/23  0511         K 3.5      CO2 21*   BUN 14   CREATININE 0.8   CALCIUM 10.2     CBC:   Recent Labs   Lab 07/16/23 2050 07/17/23  0511   WBC 3.71* 8.01   HGB 11.8* 12.6   HCT 37.5 39.7    210     CMP:   Recent Labs   Lab 07/16/23 2050  07/17/23  0511    141   K 3.9 3.5    107   CO2 24 21*   GLU 96 103   BUN 16 14   CREATININE 1.0 0.8   CALCIUM 9.4 10.2   PROT 6.9 7.9   ALBUMIN 3.8 4.2   BILITOT 0.3 0.3   ALKPHOS 46* 53*   AST 15 18   ALT 12 9*   ANIONGAP 10 13     Cardiac Markers:   Recent Labs   Lab 07/16/23 2050   BNP <10     Coagulation: No results for input(s): PT, INR, APTT in the last 48 hours.  Lactic Acid: No results for input(s): LACTATE in the last 48 hours.  Lipase: No results for input(s): LIPASE in the last 48 hours.  Lipid Panel: No results for input(s): CHOL, HDL, LDLCALC, TRIG, CHOLHDL in the last 48 hours.  Magnesium: No results for input(s): MG in the last 48 hours.  POCT Glucose: No results for input(s): POCTGLUCOSE in the last 48 hours.  Prealbumin: No results for input(s): PREALBUMIN in the last 48 hours.  Respiratory Culture: No results for input(s): GSRESP, RESPIRATORYC in the last 48 hours.  Troponin:   Recent Labs   Lab 07/16/23 2050 07/16/23  2303 07/17/23  0511   TROPONINI 0.016 0.027* 0.040*     TSH: No results for input(s): TSH in the last 4320 hours.  Urine Culture: No results for input(s): LABURIN in the last 48 hours.  Urine Studies:   Recent Labs   Lab 07/16/23 2202   COLORU Yellow   APPEARANCEUA Clear   PHUR 7.0   SPECGRAV 1.020   PROTEINUA 2+*   GLUCUA Negative   KETONESU Negative   BILIRUBINUA Negative   OCCULTUA Negative   NITRITE Negative   UROBILINOGEN Negative   LEUKOCYTESUR 1+*   RBCUA 1   WBCUA 11*   BACTERIA Moderate*   SQUAMEPITHEL 10   HYALINECASTS 0       Significant Imaging: I have reviewed all pertinent imaging results/findings within the past 24 hours.

## 2023-07-17 NOTE — ASSESSMENT & PLAN NOTE
Patient has a current diagnosis of Hypertensive emergency with end organ damage evidenced by elevated troponin which is controlled.  Latest blood pressure and vitals reviewed-   Temp:  [97.3 °F (36.3 °C)-98.5 °F (36.9 °C)]   Pulse:  [63-97]   Resp:  [12-38]   BP: (150-246)/()   SpO2:  [95 %-100 %] .   Patient currently off IV antihypertensives.   Home meds for hypertension were reviewed and noted below.   Hypertension Medications             amlodipine (NORVASC) 10 MG tablet Take 1 tablet (10 mg total) by mouth once daily.    cloNIDine (CATAPRES) 0.1 MG tablet Take one tablet by mouth twice a day    cloNIDine (CATAPRES) 0.1 MG tablet TAKE ONE TABLET BY MOUTH TWICE DAILY    furosemide (LASIX) 20 MG tablet Take 20 mg by mouth daily as needed.    furosemide (LASIX) 20 MG tablet Take 1 tablet Once a day Orally 30 days    hydrALAZINE (APRESOLINE) 50 MG tablet Take one tablet by mouth three times a day    hydrALAZINE (APRESOLINE) 50 MG tablet Take one tablet by mouth three times a day with food    hydrochlorothiazide (HYDRODIURIL) 12.5 MG Tab Take 12.5 mg by mouth once daily.    metoprolol tartrate (LOPRESSOR) 50 MG tablet Take 50 mg by mouth 2 (two) times daily.    NIFEdipine (PROCARDIA-XL) 30 MG (OSM) 24 hr tablet Take one tablet by mouth twice a day    NIFEdipine (PROCARDIA-XL) 30 MG (OSM) 24 hr tablet Take one tablet by mouth twice a day    NIFEdipine (PROCARDIA-XL) 60 MG (OSM) 24 hr tablet TAKE 1 TABLET BY MOUTH DAILY.    NIFEdipine (PROCARDIA-XL) 90 MG (OSM) 24 hr tablet Take one tablet by mouth once a day    NIFEdipine (PROCARDIA-XL) 90 MG (OSM) 24 hr tablet TAKE ONE TABLET BY MOUTH ONCE A DAY ON EMPTY STOMACH          Medication adjustment for hospital antihypertensives is as follows- Nifedipine, Hydralazine and clonidine     Will aim for controlled BP reduction by medications noted above. Monitor and mitigate end organ damage as indicated.

## 2023-07-17 NOTE — ED PROVIDER NOTES
Encounter Date: 7/16/2023       History     Chief Complaint   Patient presents with    Hypertension     Elevated BP while at home- 190s/90s. Denies any symptoms. States ran out of her nifedipine - last took medication yesterday morning.       Leticia Mejia is a 73 y.o. female that presents with a lady blood pressure  There was some confusion out her local pharmacy, did not get her BP Rx  Patient states she did not get her 90 milligram nifedipine pills refilled  Patient states that her blood pressure subsequently is high & feels bad  Patient denies any headache, denies any chest pain or shortness of breath  Patient denies any nausea vomiting, patient's systolic blood pressure is 248  Patient has no blurred vision, no signs of multiorgan failure on ER triage    Review of patient's allergies indicates:   Allergen Reactions    Dye     Sulfa (sulfonamide antibiotics)     Zestril [lisinopril] Swelling     Past Medical History:   Diagnosis Date    Hypertension     Leukemia      History reviewed. No pertinent surgical history.  History reviewed. No pertinent family history.  Social History     Tobacco Use    Smoking status: Never    Smokeless tobacco: Never   Substance Use Topics    Alcohol use: No    Drug use: No     Review of Systems   Constitutional: Negative.    HENT: Negative.     Eyes: Negative.    Respiratory: Negative.     Cardiovascular: Negative.    Gastrointestinal: Negative.    Genitourinary: Negative.    Musculoskeletal: Negative.    Skin: Negative.    Neurological: Negative.    Psychiatric/Behavioral: Negative.       Physical Exam     Initial Vitals [07/16/23 2036]   BP Pulse Resp Temp SpO2   (!) 218/98 82 18 97.3 °F (36.3 °C) 95 %      MAP       --         Physical Exam    Nursing note and vitals reviewed.  Constitutional: She appears well-developed.   HENT:   Head: Normocephalic and atraumatic.   Right Ear: External ear normal.   Left Ear: External ear normal.   Nose: Nose normal.   Mouth/Throat: Oropharynx  is clear and moist.   Eyes: Conjunctivae and EOM are normal. Pupils are equal, round, and reactive to light.   Neck: Neck supple. No JVD present.   Normal range of motion.  Cardiovascular:  Normal rate and regular rhythm.           Pulmonary/Chest: No respiratory distress. She has no wheezes. She has no rhonchi. She has no rales. She exhibits no tenderness.   Abdominal: Abdomen is soft. Bowel sounds are normal. She exhibits no distension. There is no abdominal tenderness. There is no rebound.   Musculoskeletal:         General: Normal range of motion.      Cervical back: Normal range of motion and neck supple.     Neurological: She is alert and oriented to person, place, and time. She has normal strength and normal reflexes.   Skin: Skin is warm and dry.       ED Course   Procedures  Labs Reviewed   COMPREHENSIVE METABOLIC PANEL - Abnormal; Notable for the following components:       Result Value    Alkaline Phosphatase 46 (*)     eGFR 59 (*)     All other components within normal limits   CBC W/ AUTO DIFFERENTIAL - Abnormal; Notable for the following components:    WBC 3.71 (*)     Hemoglobin 11.8 (*)     MCHC 31.5 (*)     RDW 15.9 (*)     All other components within normal limits   URINALYSIS, REFLEX TO URINE CULTURE - Abnormal; Notable for the following components:    Protein, UA 2+ (*)     Leukocytes, UA 1+ (*)     All other components within normal limits    Narrative:     Specimen Source->Urine   URINALYSIS MICROSCOPIC - Abnormal; Notable for the following components:    WBC, UA 11 (*)     Bacteria Moderate (*)     All other components within normal limits    Narrative:     Specimen Source->Urine   TROPONIN I - Abnormal; Notable for the following components:    Troponin I 0.027 (*)     All other components within normal limits   CULTURE, URINE   CK   TROPONIN I   B-TYPE NATRIURETIC PEPTIDE     EKG Readings: (Independently Interpreted)   No STEMI  Normal sinus rhythm  No ectopy  Normal conduction  Normal ST  segments  Normal T-wave  Normal axis  Heart rate in the 60s     Imaging Results              X-Ray Chest 1 View (Final result)  Result time 07/16/23 21:20:38      Final result by Jose Rowan MD (07/16/23 21:20:38)                   Impression:      No acute radiographic abnormality.      Electronically signed by: Jose Rowan  Date:    07/16/2023  Time:    21:20               Narrative:    EXAMINATION:  XR CHEST 1 VIEW    CLINICAL HISTORY:  Hypertension;    TECHNIQUE:  Single frontal view of the chest was performed.    COMPARISON:  05/16/2022    FINDINGS:  The lungs are clear, with normal appearance of pulmonary vasculature and no pleural effusion or pneumothorax.    The cardiac silhouette is normal in size. The hilar and mediastinal contours are unremarkable.    Bones are intact.  Dextroscoliosis of the lower thoracic spine.                                       Medications   cefTRIAXone (ROCEPHIN) 2 g in dextrose 5 % in water (D5W) 5 % 100 mL IVPB (MB+) (2 g Intravenous Not Given 7/16/23 2230)   hydrALAZINE injection 20 mg (20 mg Intravenous Given 7/16/23 2051)   hydrALAZINE injection 20 mg (20 mg Intravenous Given 7/16/23 2134)   NIFEdipine 24 hr tablet 90 mg (90 mg Oral Given 7/16/23 2130)   NIFEdipine (PROCARDIA-XL) 30 MG (OSM) 24 hr tablet (  Override Pull 7/16/23 2145)   hydrALAZINE injection 10 mg (10 mg Intravenous Given 7/16/23 2320)                       Medical Decision Making  High blood pressure at home after confusion with a recent med refill  Systolic blood pressure of 248, does not feel well, generalized fatigue  Denies any chest pain or shortness of breath, no signs of distress    Problems Addressed:  Acute cystitis without hematuria: complicated acute illness or injury  Elevated troponin: complicated acute illness or injury  Hypertensive emergency: complicated acute illness or injury    Amount and/or Complexity of Data Reviewed  External Data Reviewed: notes.  Labs: ordered. Decision-making  details documented in ED Course.  Radiology: ordered and independent interpretation performed.  ECG/medicine tests: ordered and independent interpretation performed.    ED Management & Risk of Complications, Morbidity, Mortality:  Patient with generalized illness & fatigue with a SBP of 248 today  EKG within normal limits, stable lab work in the emergency room  IV hydralazine given multiple times with better blood pressure now  Repeat troponin was slightly bumped at 0.027, denies any chest pain  Could be signs of end-organ failure with hypertensive crisis vs urgency    Will admit starting all of her home medications for blood pressure tonight  Will also order IV p.r.n. hydralazine with a cardiology consult in the morning  Patient sees [facility] with malissa Thacker for [place]    Critical Care ED Physician Time (minutes):  -- Performed by: Cristi Manzo M.D.  -- Date/Time: 11:50 PM 7/16/2023   -- Direct Patient Care (Face Time): 15  -- Additional History from Records or Taking Additional History: 15  -- Ordering, Reviewing, and Interpreting Diagnostic Studies: 15  -- Total Time in Documentation: 15  -- Consultation with Other Physicians: 15  -- Consultation with Family Related to Condition: 0  -- Total Critical Care Time: 75  -- Critical care was necessary to treat the following conditions:   -- Critical care was time spent personally by me on the following activities:   -- discussions with consultants regarding treatment plan today  -- development of treatment plan with patient & their family  -- examination of patient, ordering and performing treatments   -- review of radiographic studies, re-evaluation of pt's condition  -- review of labs and evaluation of response to treatment             Clinical Impression:   Final diagnoses:  [I16.1] Hypertensive emergency (Primary)  [N30.00] Acute cystitis without hematuria  [R77.8] Elevated troponin        ED Disposition Condition    Observation Stable                 Cristi Manzo MD  07/16/23 4541

## 2023-07-17 NOTE — EICU
Comments: Video rounds completed. Pt lying in bed R/A noted. Bedside nurse in room during visit. Cardene gtt infusing as ordered. Bp 180/84 hr 92 o2 sats 98% resp 16 per monitor. No distress noted

## 2023-07-17 NOTE — EICU
Intervention Initiated From:  [facility] / EICU    Brenton intervened regarding:  Documentation    Nurse Notified:  No    Doctor Notified:  No    Comments:Video rounding completed on patient. VS on cardiac monitor P: 77; RR 22, Sa02 98% on RA. PIV right AC 20 gauze angiocatheter inserted on the 16th of July, PIV right forearm  and left AC 20 gauze angio catheter both not due to be changed till the 21st. Purquick catheter in tact. Female at bedside. NAD noted. WCTM as needed.

## 2023-07-17 NOTE — EICU
EICU BRIEF ADMIT NOTE:    HISTORY: Please refer to H/P and ER notes for detail. The patient ran out of her blood pressure medications.  Came to the ED with elevated blood pressure.  Denies any shortness of breath or chest pain.  Denies any dizziness/lightheadedness/focal weakness.    CAMERA ASSESSMENT: Two way audiovisual assessment was done. No distress    Telemetry was reviewed. Medical records including notes, labs and imaging were reviewed.    DISCUSSED with bedside nurse.    ASSESSMENT AND PLAN:    Hypertension.  The patient is asymptomatic.  No evidence of end organ damage. Chest x-ray without any evidence of pulmonary edema.  EKG, no acute ST changes. Hypertension likely related to noncompliance as the patient ran out of antihypertensive medications. Currently on nicardipine infusion. The patient has been started on amlodipine, clonidine, PO hydralazine, hydrochlorothiazide and nifedipine XL.  The patient got a dose of nifedipine XL last night.  All other medications are scheduled for 9 AM in the morning.  Will discontinue amlodipine ( already on nifedipine XL).  We will administer first dose of by mouth hydralazine and hydrochlorthiazide now. ( Leave clonidine for 9 AM).  Primary team to review home medications in a.m. Discontinue nicardipine infusion. Continue IV hydralazine as needed.    BEST PRACTICES REVIEW:    GLYCEMIN CONTROL:  continue to monitor blood glucose levels  STRESS ULCER PROPHYLAXIS: not indicated  DVT PROPHYLAXIS:   SCD    Thank You for allowing [facility] to participate in the care of the patient. Please call as needed      Odell Malone MD  [facility]  [phone]

## 2023-07-17 NOTE — NURSING
0430 Received report From [facility] on a 73 year old with Hypertensive emergency. Alert and oriented times 4. Anxious and cooperative. Oriented to ICU and discussed plan of care with patient. Cardene infusion in progress at 12.5 mg/hr. Continuous cardiac monitoring in progress. HR 89 NSR and B/P 180/84. Sat on room air 99. External female catheter in progress. Will continue to monitor. Side rails times 2 up, call button in reach, and bed low and locked.    0500 Cardene infusion discontinued as per Dr. Faisal GALVAN.     0615 Patient sleeping. B/P 155/70 and . HR 74 NSR, and Sat 97%. No distress assessed at this time.

## 2023-07-17 NOTE — PT/OT/SLP EVAL
"Occupational Therapy   Evaluation    Name: Leticia Mejia  MRN: [id number]  Admitting Diagnosis: Hypertensive emergency without congestive heart failure  Recent Surgery: * No surgery found *      Recommendations:     Discharge Recommendations: home with home health, home health PT, home health OT  Discharge Equipment Recommendations:  walker, rolling  Barriers to discharge:  Other (Comment) (Increased weakness and reduced endurance.)    Assessment:     Leticia Mejia is a 73 y.o. female with a medical diagnosis of Hypertensive emergency without congestive heart failure.  She presents with performance deficits affecting function: weakness, impaired endurance, impaired self care skills, impaired balance, gait instability, impaired functional mobility, decreased upper extremity function, decreased lower extremity function. Pt tolerating bed mobility with SBA to reach seated EOB demonstrating fair+ seated static/dynamic balance. MIN > MOD A for upper extremity and LE dressing with MIN A sit > stand from bedside using RW. CGA for ambulation using RW and gait belt ~6 feet to and from bedside table to perform standing ADL. Pt becoming fatigued and requested return to bed following ~2 minutes standing.     Rehab Prognosis: Fair; patient would benefit from acute skilled OT services to address these deficits and reach maximum level of function.       Plan:     Patient to be seen 5 x/week to address the above listed problems via self-care/home management, therapeutic exercises, therapeutic activities  Plan of Care Expires: 07/31/23  Plan of Care Reviewed with:      Subjective     Chief Complaint: "I can't do what I  used to."  Patient/Family Comments/goals: To get stronger.     Occupational Profile:  Living Environment: Pt lives with grand daughter in [facility] with 3 [place]. Pt reports that she has 24 hour supervision with grandchildren each staying with her throughout the week.   Previous level of function: Pt reports MOD I > MOD A for ADL " "tasks with assistance from grand children. Pt reports that she "does what she can" until she requires help, then calls for her grand children.   Equipment Used at Home: rollator, shower chair, bedside commode, cane, straight  Assistance upon Discharge: Family     Pain/Comfort:  Pain Rating 1: 0/10  Pain Rating Post-Intervention 1: 0/10    Patients cultural, spiritual, Hinduism conflicts given the current situation: no    Objective:     Communicated with: Nursing prior to session.  Patient found HOB elevated with telemetry, peripheral IV, pulse ox (continuous), blood pressure cuff, PureWick upon OT entry to room.    General Precautions: Standard, fall  Orthopedic Precautions: N/A  Braces: N/A  Respiratory Status: Room air    Occupational Performance:    Bed Mobility:    Patient completed Rolling/Turning to Left with  stand by assistance  Patient completed Scooting/Bridging with stand by assistance  Patient completed Supine to Sit with stand by assistance  Patient completed Sit to Supine with stand by assistance    Functional Mobility/Transfers:  Patient completed Sit <> Stand Transfer with minimum assistance  with  rolling walker   Functional Mobility: Pt ambulated ~6 feet to and from bedside table to perform standing ADL.     Activities of Daily Living:  Feeding:  modified independence at bed level with set up.  Grooming: stand by assistance for safety in standing to brush teeth and wash face.  Upper Body Dressing: moderate assistance to bring gown over shoulders while seated EOB.  Lower Body Dressing: moderate assistance Pt able to don right sock independently and unable to don left due to hip pain.  Toileting: total assistance Pt with Pure Wick    Cognitive/Visual Perceptual:  Cognitive/Psychosocial Skills:     -       Oriented to: Person, Place, Time, and Situation   -       Follows Commands/attention:Follows multistep  commands  -       Communication: clear/fluent  -       Memory: No Deficits noted    Physical " Exam:  Postural examination/scapula alignment:    -       Rounded shoulders  -       Forward head  Upper Extremity Range of Motion:     -       Right Upper Extremity: WFL except >90 degrees with limitation due to previous injury.  -       Left Upper Extremity: WFL  Upper Extremity Strength:    -       Right Upper Extremity: WFL except -4/5  -       Left Upper Extremity: WFL except -4/5   Strength:    -       Right Upper Extremity: WFL  -       Left Upper Extremity: WFL    AMPAC 6 Click ADL:  AMPAC Total Score: 18    Treatment & Education:  Evaluation completed with Pt and family member. Pt educated on role and POC of OT.    Patient left HOB elevated with all lines intact, call button in reach, bed alarm on, nursing notified, and family present    GOALS:   Multidisciplinary Problems       Occupational Therapy Goals          Problem: Occupational Therapy    Goal Priority Disciplines Outcome Interventions   Occupational Therapy Goal     OT, PT/OT     Description: Pt to perform UB dressing with MOD I seated EOB.  Pt to perform self toileting with MIN A using BSC.  Pt to perform level functional transfers required for ADL's with CGA, RW level.  Pt to demonstrate fair+ dynamic standing balance as required to perform ADL's from standing level.  Pt to participate in standing ADL activity at sink for >5 minutes for increased activity tolerance and safety upon discharge.                       History:     Past Medical History:   Diagnosis Date    Hypertension     Leukemia          Past Surgical History:   Procedure Laterality Date    TONSILLECTOMY      WRIST FRACTURE SURGERY Left 07/01/1994       Time Tracking:     OT Date of Treatment:    OT Start Time: 1230  OT Stop Time: 1307  OT Total Time (min): 37 min    Billable Minutes:Evaluation 37    7/17/2023

## 2023-07-17 NOTE — ED TRIAGE NOTES
73 y.o. female presents to ER ED 01/ED 01A   Chief Complaint   Patient presents with    Hypertension     Elevated BP while at home- 190s/90s. Denies any symptoms. States ran out of her nifedipine - last took medication yesterday morning.

## 2023-07-17 NOTE — ED NOTES
Pt provided a urine specimen cup, castile soap towelette wipe, and instructions for [facility]. Pt verbalizes understanding of a clean catch collection. Will continue to monitor.

## 2023-07-17 NOTE — HPI
Patient is a 73 year old female with medical history of HTN and leukemia who presented to the ED with HTN.  She states there was a miscommunication with the pharmacy and was unable to get her nifedipine refilled.  She is on nifedipine daily, clonidine and hydralazine at home.  Lasix prn.    She denies fever, chills, CP, SOB, nausea and vomiting.      Admitted for hypertensive emergency.   at presentation.  Placed on cardene gtt.

## 2023-07-17 NOTE — CONSULTS
[facility]  Cardiology  Consult Note    Patient Name: Leticia Mejia  MRN: [id number]  Admission Date: 7/16/2023  Hospital Length of Stay: 0 days  Code Status: Full Code   Attending Provider:   Consulting Provider: GODFREY MCDONNELL NP  Primary Care Physician: Vicky Barahona MD  Principal Problem:Hypertensive emergency without congestive heart failure    Patient information was obtained from patient, past medical records, and ER records.     Consults  Subjective:     Chief Complaint: blood pressure elevated, unable to get correct BP medication    HPI: 73 year old female with history of bilateral carotid artery stenosis, hyplipidemia, HHD, obesity and leukemia, presented to ER with elevated BP. Reports she picked up medication from pharmacy on 7/15, nifedipine was not refilled by the pharmacy. She did not take nifedipine on 7/16, began monitoring BP and noticed readings kept getting higher, on presentation to ER  systolic.  Reports compliance with all other prescribed medication.  Denies associated chest pain, shortness of breath, n/v. Placed on cardene infusion and admitted to CCU. Home medications resumed. CXR with acute abnormality, BNP ,initial set of cardiac enzymes within normal limits, EKG no acute ischemic changes. CIS asked to assist with elevated BP    On exam this AM patient sitting up in bed, cardene infusion weaned off, BP improved, patient denies any CP, SOB, palpitations. Does report mild headache.      Past Medical History:   Diagnosis Date    Hypertension     Leukemia        Past Surgical History:   Procedure Laterality Date    TONSILLECTOMY      WRIST FRACTURE SURGERY Left 07/01/1994       Review of patient's allergies indicates:   Allergen Reactions    Dye     Sulfa (sulfonamide antibiotics)     Zestril [lisinopril] Swelling       No current facility-administered medications on file prior to encounter.     Current Outpatient Medications on File Prior to Encounter   Medication Sig     amlodipine (NORVASC) 10 MG tablet Take 1 tablet (10 mg total) by mouth once daily.    atorvastatin (LIPITOR) 20 MG tablet Take 20 mg by mouth once daily.    atorvastatin (LIPITOR) 40 MG tablet Take 1 tablet orally once a day.    atorvastatin (LIPITOR) 40 MG tablet Take 1 tablet orally once a day.    COURTESY GHAZAL, APPT. NEEDED    cloNIDine (CATAPRES) 0.1 MG tablet Take one tablet by mouth twice a day    cloNIDine (CATAPRES) 0.1 MG tablet TAKE ONE TABLET BY MOUTH TWICE DAILY    furosemide (LASIX) 20 MG tablet Take 20 mg by mouth daily as needed.    furosemide (LASIX) 20 MG tablet Take 1 tablet Once a day Orally 30 days    hydrALAZINE (APRESOLINE) 50 MG tablet Take one tablet by mouth three times a day    hydrALAZINE (APRESOLINE) 50 MG tablet Take one tablet by mouth three times a day with food    hydrochlorothiazide (HYDRODIURIL) 12.5 MG Tab Take 12.5 mg by mouth once daily.    HYDROcodone-acetaminophen (NORCO)  mg per tablet TAKE 1 TABLET BY MOUTH EVERY DAY    HYDROcodone-acetaminophen (NORCO)  mg per tablet Take 1 table by mouth every day.    ICLUSIG 30 mg Tab Take 1 tablet by mouth daily as needed.    latanoprost 0.005 % ophthalmic solution instill 1 drop into affected eye(s) once daily in the evening    meclizine (ANTIVERT) 12.5 mg tablet Take 1 tablet (12.5 mg total) by mouth 3 (three) times daily as needed for Dizziness.    meclizine (ANTIVERT) 25 mg tablet Take 1 tablet by mouth 2 times a day.    methylPREDNISolone (MEDROL DOSEPACK) 4 mg tablet Pack as directed    metoprolol tartrate (LOPRESSOR) 50 MG tablet Take 50 mg by mouth 2 (two) times daily.    NIFEdipine (PROCARDIA-XL) 30 MG (OSM) 24 hr tablet Take one tablet by mouth twice a day    NIFEdipine (PROCARDIA-XL) 30 MG (OSM) 24 hr tablet Take one tablet by mouth twice a day    NIFEdipine (PROCARDIA-XL) 60 MG (OSM) 24 hr tablet TAKE 1 TABLET BY MOUTH DAILY.    NIFEdipine (PROCARDIA-XL) 90 MG (OSM) 24 hr tablet Take one tablet by mouth once a  day    NIFEdipine (PROCARDIA-XL) 90 MG (OSM) 24 hr tablet TAKE ONE TABLET BY MOUTH ONCE A DAY ON EMPTY STOMACH    ondansetron (ZOFRAN-ODT) 4 MG TbDL Take 1 tablet (4 mg total) by mouth every 8 (eight) hours as needed (nausea).    ponatinib (ICLUSIG) 15 mg Tab Take by mouth.    potassium chloride SA (K-DUR,KLOR-CON) 10 MEQ tablet Take 10 mEq by mouth 2 (two) times daily.     Family History       Problem Relation (Age of Onset)    Cancer Mother    Heart disease Maternal Uncle    Hypertension Father, Sister, Daughter, Son          Tobacco Use    Smoking status: Never    Smokeless tobacco: Never   Substance and Sexual Activity    Alcohol use: Not on file    Drug use: No    Sexual activity: Not Currently     Review of Systems   Constitutional: Negative.   HENT: Negative.     Eyes: Negative.    Cardiovascular: Negative.    Respiratory: Negative.     Skin: Negative.    Musculoskeletal: Negative.    Neurological: Negative.    Psychiatric/Behavioral: Negative.     Objective:     Vital Signs (Most Recent):  Temp: 98.3 °F (36.8 °C) (07/17/23 1101)  Pulse: 76 (07/17/23 1101)  Resp: 19 (07/17/23 1101)  BP: (!) 144/69 (07/17/23 1101)  SpO2: 97 % (07/17/23 1101) Vital Signs (24h Range):  Temp:  [97.3 °F (36.3 °C)-98.5 °F (36.9 °C)] 98.3 °F (36.8 °C)  Pulse:  [63-97] 76  Resp:  [12-38] 19  SpO2:  [95 %-100 %] 97 %  BP: (144-246)/() 144/69     Weight: 79 kg (174 lb 2.6 oz)  Body mass index is 29.9 kg/m².    SpO2: 97 %         Intake/Output Summary (Last 24 hours) at 7/17/2023 1132  Last data filed at 7/17/2023 0626  Gross per 24 hour   Intake 113.76 ml   Output 400 ml   Net -286.24 ml       Lines/Drains/Airways       Drain  Duration             Female External Urinary Catheter 07/17/23 0430 <1 day              Peripheral Intravenous Line  Duration                  Peripheral IV - Single Lumen 07/16/23 2050 20 G Right Antecubital <1 day         Peripheral IV - Single Lumen 07/17/23 0245 20 G Right Forearm <1 day          Peripheral IV - Single Lumen 07/17/23 0258 20 G Left Antecubital <1 day                    Physical Exam  Constitutional:       Appearance: Normal appearance.   HENT:      Head: Normocephalic.   Cardiovascular:      Rate and Rhythm: Normal rate and regular rhythm.      Pulses: Normal pulses.      Heart sounds: Murmur heard.   Pulmonary:      Effort: Pulmonary effort is normal.      Breath sounds: Normal breath sounds.   Musculoskeletal:         General: Normal range of motion.   Skin:     General: Skin is warm and dry.      Capillary Refill: Capillary refill takes less than 2 seconds.   Neurological:      General: No focal deficit present.      Mental Status: She is alert and oriented to person, place, and time.   Psychiatric:         Behavior: Behavior normal.       Significant Labs: ABG: No results for input(s): PH, PCO2, HCO3, POCSATURATED, BE in the last 48 hours., Blood Culture: No results for input(s): LABBLOO in the last 48 hours., BMP:   Recent Labs   Lab 07/16/23 2050 07/17/23  0511   GLU 96 103    141   K 3.9 3.5    107   CO2 24 21*   BUN 16 14   CREATININE 1.0 0.8   CALCIUM 9.4 10.2   , CMP   Recent Labs   Lab 07/16/23 2050 07/17/23  0511    141   K 3.9 3.5    107   CO2 24 21*   GLU 96 103   BUN 16 14   CREATININE 1.0 0.8   CALCIUM 9.4 10.2   PROT 6.9 7.9   ALBUMIN 3.8 4.2   BILITOT 0.3 0.3   ALKPHOS 46* 53*   AST 15 18   ALT 12 9*   ANIONGAP 10 13   , CBC   Recent Labs   Lab 07/16/23 2050 07/17/23  0511   WBC 3.71* 8.01   HGB 11.8* 12.6   HCT 37.5 39.7    210   , INR No results for input(s): INR, PROTIME in the last 48 hours., Lipid Panel No results for input(s): CHOL, HDL, LDLCALC, TRIG, CHOLHDL in the last 48 hours., Troponin   Recent Labs   Lab 07/16/23  2303 07/17/23  0511 07/17/23  1106   TROPONINI 0.027* 0.040* 0.052*   , All pertinent lab results from the last 24 hours have been reviewed., and   Recent Lab Results  (Last 5 results in the past 24 hours)         07/17/23  1106   07/17/23  0511   07/16/23  2303   07/16/23  2202   07/16/23  2050        Albumin   4.2       3.8       Alkaline Phosphatase   53       46       ALT   9       12       Anion Gap   13       10       Appearance, UA       Clear         AST   18       15       Bacteria, UA       Moderate         Baso #   0.01       0.01       Basophil %   0.1       0.3       Bilirubin (UA)       Negative         BILIRUBIN TOTAL   0.3  Comment: For infants and newborns, interpretation of results should be based  on gestational age, weight and in agreement with clinical  observations.    Premature Infant recommended reference ranges:  Up to 24 hours.............<8.0 mg/dL  Up to 48 hours............<12.0 mg/dL  3-5 days..................<15.0 mg/dL  6-29 days.................<15.0 mg/dL         0.3  Comment: For infants and newborns, interpretation of results should be based  on gestational age, weight and in agreement with clinical  observations.    Premature Infant recommended reference ranges:  Up to 24 hours.............<8.0 mg/dL  Up to 48 hours............<12.0 mg/dL  3-5 days..................<15.0 mg/dL  6-29 days.................<15.0 mg/dL         BNP         <10  Comment: Values of less than 100 pg/ml are consistent with non-CHF populations.       BUN   14       16       Calcium   10.2       9.4       Chloride   107       108       CO2   21       24       Color, UA       Yellow         CPK         119       Creatinine   0.8       1.0       Differential Method   Automated       Automated       eGFR   >60       59       Eos #   0.0       0.1       Eosinophil %   0.5       1.3       Glucose   103       96       Glucose, UA       Negative         Gran # (ANC)   6.0       1.9       Gran %   75.3       51.4       Hematocrit   39.7       37.5       Hemoglobin   12.6       11.8       Hyaline Casts, UA       0         Immature Grans (Abs)   0.02  Comment: Mild elevation in immature granulocytes is non specific and   can  be seen in a variety of conditions including stress response,   acute inflammation, trauma and pregnancy. Correlation with other   laboratory and clinical findings is essential.         0.01  Comment: Mild elevation in immature granulocytes is non specific and   can be seen in a variety of conditions including stress response,   acute inflammation, trauma and pregnancy. Correlation with other   laboratory and clinical findings is essential.         Immature Granulocytes   0.2       0.3       Ketones, UA       Negative         Leukocytes, UA       1+         Lymph #   1.3       1.3       Lymph %   16.0       35.6       MCH   27.0       27.1       MCHC   31.7       31.5       MCV   85       86       Microscopic Comment       SEE COMMENT  Comment: Other formed elements not mentioned in the report are not   present in the microscopic examination.            Mono #   0.6       0.4       Mono %   7.9       11.1       MPV   10.6       10.1       NITRITE UA       Negative         nRBC   0       0       Occult Blood UA       Negative         pH, UA       7.0         Platelets   210       191       Potassium   3.5       3.9       PROTEIN TOTAL   7.9       6.9       Protein, UA       2+  Comment: Recommend a 24 hour urine protein or a urine   protein/creatinine ratio if globulin induced proteinuria is  clinically suspected.           RBC   4.66       4.35       RBC, UA       1         RDW   15.9       15.9       Sodium   141       142       Specific Gravity, UA       1.020         Specimen UA       Urine, Clean Catch         Squam Epithel, UA       10         Troponin I 0.052  Comment: The reference interval for Troponin I represents the 99th percentile   cutoff   for our facility and is consistent with 3rd generation assay   performance.     0.040  Comment: The reference interval for Troponin I represents the 99th percentile   cutoff   for our facility and is consistent with 3rd generation assay   performance.      0.027  Comment: The reference interval for Troponin I represents the 99th percentile   cutoff   for our facility and is consistent with 3rd generation assay   performance.       0.016  Comment: The reference interval for Troponin I represents the 99th percentile   cutoff   for our facility and is consistent with 3rd generation assay   performance.         UROBILINOGEN UA       Negative         WBC, UA       11         WBC   8.01       3.71                              Significant Imaging: CT scan: CT ABDOMEN PELVIS WITH CONTRAST: No results found for this visit on 07/16/23. and CT ABDOMEN PELVIS WITHOUT CONTRAST: No results found for this visit on 07/16/23., Echocardiogram: 2D echo with color flow doppler: No results found for this or any previous visit. and Transthoracic echo (TTE) complete (Cupid Only): No results found for this or any previous visit., and X-Ray: CXR: X-Ray Chest 1 View (CXR):   Results for orders placed or performed during the hospital encounter of 07/16/23   X-Ray Chest 1 View    Narrative    EXAMINATION:  XR CHEST 1 VIEW    CLINICAL HISTORY:  Hypertension;    TECHNIQUE:  Single frontal view of the chest was performed.    COMPARISON:  05/16/2022    FINDINGS:  The lungs are clear, with normal appearance of pulmonary vasculature and no pleural effusion or pneumothorax.    The cardiac silhouette is normal in size. The hilar and mediastinal contours are unremarkable.    Bones are intact.  Dextroscoliosis of the lower thoracic spine.      Impression    No acute radiographic abnormality.      Electronically signed by: Jose Cason  Date:    07/16/2023  Time:    21:20    and X-Ray Chest PA and Lateral (CXR): No results found for this visit on 07/16/23. and KUB: X-Ray Abdomen AP 1 View (KUB): No results found for this visit on 07/16/23.    MPI  6/21/2022     Stress EKG is non-diagnostic.   The heart rate recovery is normal.      This is an abnormal perfusion study. Study is consistent with ischemia.    This scan is suggestive of moderate risk for future cardiovascular events.   Small reversible perfusion abnormality of moderate intensity in the apical segment. Small reversible perfusion abnormality of mild intensity in the apical septal segment. Small fixed perfusion abnormality of moderate intensity in the inferior lateral region.   The left ventricular cavity is noted to be normal on the stress study. The left ventricular ejection fraction was calculated to be 66% and left ventricular global function is normal.   The study quality is good.      TTE   5/24/22     The study quality is average.   The left ventricle is normal in size. Global left ventricular systolic function is normal. The left ventricular ejection fraction is 65%. Left ventricular diastolic function is normal. Noted left ventricular hypertrophy. Concentric left ventricular hypertrophy is present. It is mild to moderate.   Mild mitral annular calcification is noted. Mild calcification of the aortic valve is noted with adequate cuspal excursion.   The pulmonary artery systolic pressure is 23 mmHg.      Ejection fraction essentially remained unchanged (60% previous study, 65% current study).   Mitral valve area by pressure halftime remains unchanged (2.9 cm²).   Aortic root diameter remains unchanged (2.8 cm).          Assessment and Plan:     Active Diagnoses:    Diagnosis Date Noted POA    PRINCIPAL PROBLEM:  Hypertensive emergency without congestive heart failure [I16.1] 07/17/2023 Yes    Chronic leukemia in remission [C95.11] 07/17/2023 Yes    Elevated troponin [R77.8] 07/17/2023 Yes    Neuropathy of both feet [G57.93] 07/17/2023 Yes      Problems Resolved During this Admission:       VTE Risk Mitigation (From admission, onward)           Ordered     IP VTE HIGH RISK PATIENT  Once         07/17/23 0414     Place sequential compression device  Until discontinued         07/17/23 0414                  Plan:  HTN emergency-BP improving,  140-150/60-70s, cardene gtt weaned off  home medications resumed-atorvastatin, clonidine, hydralazine, HCTZ, and nifedipine, consider uptitrating if further BP control needed  continue to follow labs,trend cadiac ezymes, monitor on telemetry, troponin mildly elevated likely due to elevated BP on presentation, denies CP, SOB, palpitations, EKG SR/PACs no acute ischemic changes.   Recommend close follow up with cardiologist on discharge.   Will continue to follow      Thank you for your consult.     GODFREY MCDONNELL, NP  Cardiology   [facility]

## 2023-07-17 NOTE — H&P
[facility] Medicine  History & Physical    Patient Name: Leticia Mejia  MRN: [id number]  Patient Class: IP- Inpatient  Admission Date: 7/16/2023  Attending Physician: Glen Workman MD   Primary Care Provider: Vicky Barahona MD         Patient information was obtained from patient and ER records.     Subjective:     Principal Problem:Hypertensive emergency without congestive heart failure    Chief Complaint:   Chief Complaint   Patient presents with    Hypertension     Elevated BP while at home- 190s/90s. Denies any symptoms. States ran out of her nifedipine - last took medication yesterday morning.          HPI: Patient is a 73 year old female with medical history of HTN and leukemia who presented to the ED with HTN.  She states there was a miscommunication with the pharmacy and was unable to get her nifedipine refilled.  She is on nifedipine daily, clonidine and hydralazine at home.  Lasix prn.    She denies fever, chills, CP, SOB, nausea and vomiting.      Admitted for hypertensive emergency.   at presentation.  Placed on cardene gtt.                   Past Medical History:   Diagnosis Date    Hypertension     Leukemia        Past Surgical History:   Procedure Laterality Date    TONSILLECTOMY      WRIST FRACTURE SURGERY Left 07/01/1994       Review of patient's allergies indicates:   Allergen Reactions    Dye     Sulfa (sulfonamide antibiotics)     Zestril [lisinopril] Swelling       No current facility-administered medications on file prior to encounter.     Current Outpatient Medications on File Prior to Encounter   Medication Sig    amlodipine (NORVASC) 10 MG tablet Take 1 tablet (10 mg total) by mouth once daily.    atorvastatin (LIPITOR) 20 MG tablet Take 20 mg by mouth once daily.    atorvastatin (LIPITOR) 40 MG tablet Take 1 tablet orally once a day.    atorvastatin (LIPITOR) 40 MG tablet Take 1 tablet orally once a day.    COURTESY GHAZAL, APPT. NEEDED     cloNIDine (CATAPRES) 0.1 MG tablet Take one tablet by mouth twice a day    cloNIDine (CATAPRES) 0.1 MG tablet TAKE ONE TABLET BY MOUTH TWICE DAILY    furosemide (LASIX) 20 MG tablet Take 20 mg by mouth daily as needed.    furosemide (LASIX) 20 MG tablet Take 1 tablet Once a day Orally 30 days    hydrALAZINE (APRESOLINE) 50 MG tablet Take one tablet by mouth three times a day    hydrALAZINE (APRESOLINE) 50 MG tablet Take one tablet by mouth three times a day with food    hydrochlorothiazide (HYDRODIURIL) 12.5 MG Tab Take 12.5 mg by mouth once daily.    HYDROcodone-acetaminophen (NORCO)  mg per tablet TAKE 1 TABLET BY MOUTH EVERY DAY    HYDROcodone-acetaminophen (NORCO)  mg per tablet Take 1 table by mouth every day.    ICLUSIG 30 mg Tab Take 1 tablet by mouth daily as needed.    latanoprost 0.005 % ophthalmic solution instill 1 drop into affected eye(s) once daily in the evening    meclizine (ANTIVERT) 12.5 mg tablet Take 1 tablet (12.5 mg total) by mouth 3 (three) times daily as needed for Dizziness.    meclizine (ANTIVERT) 25 mg tablet Take 1 tablet by mouth 2 times a day.    methylPREDNISolone (MEDROL DOSEPACK) 4 mg tablet Pack as directed    metoprolol tartrate (LOPRESSOR) 50 MG tablet Take 50 mg by mouth 2 (two) times daily.    NIFEdipine (PROCARDIA-XL) 30 MG (OSM) 24 hr tablet Take one tablet by mouth twice a day    NIFEdipine (PROCARDIA-XL) 30 MG (OSM) 24 hr tablet Take one tablet by mouth twice a day    NIFEdipine (PROCARDIA-XL) 60 MG (OSM) 24 hr tablet TAKE 1 TABLET BY MOUTH DAILY.    NIFEdipine (PROCARDIA-XL) 90 MG (OSM) 24 hr tablet Take one tablet by mouth once a day    NIFEdipine (PROCARDIA-XL) 90 MG (OSM) 24 hr tablet TAKE ONE TABLET BY MOUTH ONCE A DAY ON EMPTY STOMACH    ondansetron (ZOFRAN-ODT) 4 MG TbDL Take 1 tablet (4 mg total) by mouth every 8 (eight) hours as needed (nausea).    ponatinib (ICLUSIG) 15 mg Tab Take by mouth.    potassium chloride SA  (K-DUR,KLOR-CON) 10 MEQ tablet Take 10 mEq by mouth 2 (two) times daily.     Family History       Problem Relation (Age of Onset)    Cancer Mother    Heart disease Maternal Uncle    Hypertension Father, Sister, Daughter, Son          Tobacco Use    Smoking status: Never    Smokeless tobacco: Never   Substance and Sexual Activity    Alcohol use: Not on file    Drug use: No    Sexual activity: Not Currently     Review of Systems   Constitutional:  Negative for chills and fever.   HENT:  Negative for ear discharge and ear pain.    Eyes:  Negative for pain and discharge.   Respiratory:  Negative for cough and shortness of breath.    Cardiovascular:  Negative for chest pain and leg swelling.   Gastrointestinal:  Negative for nausea and vomiting.   Endocrine: Negative for cold intolerance and heat intolerance.   Genitourinary:  Negative for difficulty urinating and dysuria.   Musculoskeletal:  Positive for arthralgias. Negative for joint swelling and myalgias.   Skin:  Negative for rash and wound.   Neurological:  Negative for dizziness and headaches.   Psychiatric/Behavioral:  Negative for agitation and confusion.    Objective:     Vital Signs (Most Recent):  Temp: 98.2 °F (36.8 °C) (07/17/23 0434)  Pulse: 80 (07/17/23 0704)  Resp: 12 (07/17/23 0704)  BP: (!) 156/73 (07/17/23 0704)  SpO2: 97 % (07/17/23 0704) Vital Signs (24h Range):  Temp:  [97.3 °F (36.3 °C)-98.2 °F (36.8 °C)] 98.2 °F (36.8 °C)  Pulse:  [63-97] 80  Resp:  [12-38] 12  SpO2:  [95 %-100 %] 97 %  BP: (150-246)/() 156/73     Weight: 79 kg (174 lb 2.6 oz)  Body mass index is 29.9 kg/m².     Physical Exam  Constitutional:       General: She is not in acute distress.  HENT:      Head: Normocephalic and atraumatic.   Eyes:      General:         Right eye: No discharge.         Left eye: No discharge.   Cardiovascular:      Rate and Rhythm: Normal rate and regular rhythm.   Pulmonary:      Effort: Pulmonary effort is normal.      Breath sounds: Normal  breath sounds.   Abdominal:      General: There is no distension.      Tenderness: There is no abdominal tenderness.   Musculoskeletal:         General: No swelling or tenderness.      Cervical back: Neck supple. No tenderness.   Skin:     General: Skin is warm and dry.   Neurological:      General: No focal deficit present.      Mental Status: She is alert and oriented to person, place, and time.   Psychiatric:         Mood and Affect: Mood normal.         Behavior: Behavior normal.              Significant Labs: A1C: No results for input(s): HGBA1C in the last 4320 hours.  ABGs: No results for input(s): PH, PCO2, HCO3, POCSATURATED, BE, TOTALHB, COHB, METHB, O2HB, POCFIO2, PO2 in the last 48 hours.  Bilirubin:   Recent Labs   Lab 07/16/23 2050 07/17/23 0511   BILITOT 0.3 0.3     Blood Culture: No results for input(s): LABBLOO in the last 48 hours.  BMP:   Recent Labs   Lab 07/17/23 0511         K 3.5      CO2 21*   BUN 14   CREATININE 0.8   CALCIUM 10.2     CBC:   Recent Labs   Lab 07/16/23 2050 07/17/23  0511   WBC 3.71* 8.01   HGB 11.8* 12.6   HCT 37.5 39.7    210     CMP:   Recent Labs   Lab 07/16/23 2050 07/17/23  0511    141   K 3.9 3.5    107   CO2 24 21*   GLU 96 103   BUN 16 14   CREATININE 1.0 0.8   CALCIUM 9.4 10.2   PROT 6.9 7.9   ALBUMIN 3.8 4.2   BILITOT 0.3 0.3   ALKPHOS 46* 53*   AST 15 18   ALT 12 9*   ANIONGAP 10 13     Cardiac Markers:   Recent Labs   Lab 07/16/23 2050   BNP <10     Coagulation: No results for input(s): PT, INR, APTT in the last 48 hours.  Lactic Acid: No results for input(s): LACTATE in the last 48 hours.  Lipase: No results for input(s): LIPASE in the last 48 hours.  Lipid Panel: No results for input(s): CHOL, HDL, LDLCALC, TRIG, CHOLHDL in the last 48 hours.  Magnesium: No results for input(s): MG in the last 48 hours.  POCT Glucose: No results for input(s): POCTGLUCOSE in the last 48 hours.  Prealbumin: No results for input(s):  PREALBUMIN in the last 48 hours.  Respiratory Culture: No results for input(s): GSRESP, RESPIRATORYC in the last 48 hours.  Troponin:   Recent Labs   Lab 07/16/23  2050 07/16/23  2303 07/17/23  0511   TROPONINI 0.016 0.027* 0.040*     TSH: No results for input(s): TSH in the last 4320 hours.  Urine Culture: No results for input(s): LABURIN in the last 48 hours.  Urine Studies:   Recent Labs   Lab 07/16/23 2202   COLORU Yellow   APPEARANCEUA Clear   PHUR 7.0   SPECGRAV 1.020   PROTEINUA 2+*   GLUCUA Negative   KETONESU Negative   BILIRUBINUA Negative   OCCULTUA Negative   NITRITE Negative   UROBILINOGEN Negative   LEUKOCYTESUR 1+*   RBCUA 1   WBCUA 11*   BACTERIA Moderate*   SQUAMEPITHEL 10   HYALINECASTS 0       Significant Imaging: I have reviewed all pertinent imaging results/findings within the past 24 hours.    Assessment/Plan:     * Hypertensive emergency without congestive heart failure  Patient has a current diagnosis of Hypertensive emergency with end organ damage evidenced by elevated troponin which is controlled.  Latest blood pressure and vitals reviewed-   Temp:  [97.3 °F (36.3 °C)-98.5 °F (36.9 °C)]   Pulse:  [63-97]   Resp:  [12-38]   BP: (150-246)/()   SpO2:  [95 %-100 %] .   Patient currently off IV antihypertensives.   Home meds for hypertension were reviewed and noted below.   Hypertension Medications             amlodipine (NORVASC) 10 MG tablet Take 1 tablet (10 mg total) by mouth once daily.    cloNIDine (CATAPRES) 0.1 MG tablet Take one tablet by mouth twice a day    cloNIDine (CATAPRES) 0.1 MG tablet TAKE ONE TABLET BY MOUTH TWICE DAILY    furosemide (LASIX) 20 MG tablet Take 20 mg by mouth daily as needed.    furosemide (LASIX) 20 MG tablet Take 1 tablet Once a day Orally 30 days    hydrALAZINE (APRESOLINE) 50 MG tablet Take one tablet by mouth three times a day    hydrALAZINE (APRESOLINE) 50 MG tablet Take one tablet by mouth three times a day with food    hydrochlorothiazide  (HYDRODIURIL) 12.5 MG Tab Take 12.5 mg by mouth once daily.    metoprolol tartrate (LOPRESSOR) 50 MG tablet Take 50 mg by mouth 2 (two) times daily.    NIFEdipine (PROCARDIA-XL) 30 MG (OSM) 24 hr tablet Take one tablet by mouth twice a day    NIFEdipine (PROCARDIA-XL) 30 MG (OSM) 24 hr tablet Take one tablet by mouth twice a day    NIFEdipine (PROCARDIA-XL) 60 MG (OSM) 24 hr tablet TAKE 1 TABLET BY MOUTH DAILY.    NIFEdipine (PROCARDIA-XL) 90 MG (OSM) 24 hr tablet Take one tablet by mouth once a day    NIFEdipine (PROCARDIA-XL) 90 MG (OSM) 24 hr tablet TAKE ONE TABLET BY MOUTH ONCE A DAY ON EMPTY STOMACH          Medication adjustment for hospital antihypertensives is as follows- Nifedipine, Hydralazine and clonidine     Will aim for controlled BP reduction by medications noted above. Monitor and mitigate end organ damage as indicated.    Neuropathy of both feet  Start Gabapentin       Elevated troponin  Due to hypertensive emergency  0.040 troponin/ No ST elevation on EKG  Continue to monitor       Chronic leukemia in remission  Continue home ponatinib         VTE Risk Mitigation (From admission, onward)         Ordered     IP VTE HIGH RISK PATIENT  Once         07/17/23 0414     Place sequential compression device  Until discontinued         07/17/23 0414              Critical care time spent on the evaluation and treatment of severe organ dysfunction, review of pertinent labs and imaging studies, discussions with consulting providers and discussions with patient/family: 35 minutes.             Stefani Olsen PA-C  Department of Hospital Medicine  [place] - Intensive Care

## 2023-07-18 VITALS
OXYGEN SATURATION: 97 % | HEIGHT: 64 IN | HEART RATE: 82 BPM | TEMPERATURE: 98 F | BODY MASS INDEX: 29.74 KG/M2 | DIASTOLIC BLOOD PRESSURE: 70 MMHG | SYSTOLIC BLOOD PRESSURE: 141 MMHG | WEIGHT: 174.19 LBS | RESPIRATION RATE: 22 BRPM

## 2023-07-18 LAB
ALBUMIN SERPL BCP-MCNC: 3.7 G/DL (ref 3.5–5.2)
ALP SERPL-CCNC: 46 U/L (ref 55–135)
ALT SERPL W/O P-5'-P-CCNC: 8 U/L (ref 10–44)
ANION GAP SERPL CALC-SCNC: 12 MMOL/L (ref 8–16)
AST SERPL-CCNC: 16 U/L (ref 10–40)
BACTERIA UR CULT: NO GROWTH
BASOPHILS # BLD AUTO: 0.01 K/UL (ref 0–0.2)
BASOPHILS NFR BLD: 0.2 % (ref 0–1.9)
BILIRUB SERPL-MCNC: 0.3 MG/DL (ref 0.1–1)
BUN SERPL-MCNC: 15 MG/DL (ref 8–23)
CALCIUM SERPL-MCNC: 9.5 MG/DL (ref 8.7–10.5)
CHLORIDE SERPL-SCNC: 108 MMOL/L (ref 95–110)
CO2 SERPL-SCNC: 23 MMOL/L (ref 23–29)
CREAT SERPL-MCNC: 0.9 MG/DL (ref 0.5–1.4)
DIFFERENTIAL METHOD: ABNORMAL
EOSINOPHIL # BLD AUTO: 0.1 K/UL (ref 0–0.5)
EOSINOPHIL NFR BLD: 2 % (ref 0–8)
ERYTHROCYTE [DISTWIDTH] IN BLOOD BY AUTOMATED COUNT: 16.1 % (ref 11.5–14.5)
EST. GFR  (NO RACE VARIABLE): >60 ML/MIN/1.73 M^2
GLUCOSE SERPL-MCNC: 100 MG/DL (ref 70–110)
HCT VFR BLD AUTO: 37 % (ref 37–48.5)
HGB BLD-MCNC: 11.7 G/DL (ref 12–16)
IMM GRANULOCYTES # BLD AUTO: 0.01 K/UL (ref 0–0.04)
IMM GRANULOCYTES NFR BLD AUTO: 0.2 % (ref 0–0.5)
LYMPHOCYTES # BLD AUTO: 1.7 K/UL (ref 1–4.8)
LYMPHOCYTES NFR BLD: 41.7 % (ref 18–48)
MAGNESIUM SERPL-MCNC: 1.9 MG/DL (ref 1.6–2.6)
MCH RBC QN AUTO: 27.1 PG (ref 27–31)
MCHC RBC AUTO-ENTMCNC: 31.6 G/DL (ref 32–36)
MCV RBC AUTO: 86 FL (ref 82–98)
MONOCYTES # BLD AUTO: 0.5 K/UL (ref 0.3–1)
MONOCYTES NFR BLD: 11.7 % (ref 4–15)
NEUTROPHILS # BLD AUTO: 1.8 K/UL (ref 1.8–7.7)
NEUTROPHILS NFR BLD: 44.2 % (ref 38–73)
NRBC BLD-RTO: 0 /100 WBC
PLATELET # BLD AUTO: 204 K/UL (ref 150–450)
PMV BLD AUTO: 10.7 FL (ref 9.2–12.9)
POTASSIUM SERPL-SCNC: 3.8 MMOL/L (ref 3.5–5.1)
PROT SERPL-MCNC: 6.9 G/DL (ref 6–8.4)
RBC # BLD AUTO: 4.31 M/UL (ref 4–5.4)
SODIUM SERPL-SCNC: 143 MMOL/L (ref 136–145)
TROPONIN I SERPL DL<=0.01 NG/ML-MCNC: 0.02 NG/ML (ref 0–0.03)
WBC # BLD AUTO: 4.03 K/UL (ref 3.9–12.7)

## 2023-07-18 PROCEDURE — 80053 COMPREHEN METABOLIC PANEL: CPT | Performed by: PHYSICIAN ASSISTANT

## 2023-07-18 PROCEDURE — 25000003 PHARM REV CODE 250: Performed by: PHYSICIAN ASSISTANT

## 2023-07-18 PROCEDURE — G0378 HOSPITAL OBSERVATION PER HR: HCPCS

## 2023-07-18 PROCEDURE — 25000003 PHARM REV CODE 250: Performed by: INTERNAL MEDICINE

## 2023-07-18 PROCEDURE — 83735 ASSAY OF MAGNESIUM: CPT | Performed by: PHYSICIAN ASSISTANT

## 2023-07-18 PROCEDURE — 36415 COLL VENOUS BLD VENIPUNCTURE: CPT | Performed by: PHYSICIAN ASSISTANT

## 2023-07-18 PROCEDURE — 97530 THERAPEUTIC ACTIVITIES: CPT | Mod: CO

## 2023-07-18 PROCEDURE — 97535 SELF CARE MNGMENT TRAINING: CPT | Mod: CO

## 2023-07-18 PROCEDURE — 99238 PR HOSPITAL DISCHARGE DAY,<30 MIN: ICD-10-PCS | Mod: ,,, | Performed by: PHYSICIAN ASSISTANT

## 2023-07-18 PROCEDURE — 97530 THERAPEUTIC ACTIVITIES: CPT

## 2023-07-18 PROCEDURE — 99238 HOSP IP/OBS DSCHRG MGMT 30/<: CPT | Mod: ,,, | Performed by: PHYSICIAN ASSISTANT

## 2023-07-18 PROCEDURE — 97161 PT EVAL LOW COMPLEX 20 MIN: CPT

## 2023-07-18 PROCEDURE — 25000003 PHARM REV CODE 250: Performed by: SURGERY

## 2023-07-18 PROCEDURE — 84484 ASSAY OF TROPONIN QUANT: CPT | Performed by: PHYSICIAN ASSISTANT

## 2023-07-18 PROCEDURE — 85025 COMPLETE CBC W/AUTO DIFF WBC: CPT | Performed by: PHYSICIAN ASSISTANT

## 2023-07-18 PROCEDURE — 94760 N-INVAS EAR/PLS OXIMETRY 1: CPT

## 2023-07-18 RX ORDER — GABAPENTIN 100 MG/1
100 CAPSULE ORAL DAILY
Qty: 30 CAPSULE | Refills: 0 | Status: SHIPPED | OUTPATIENT
Start: 2023-07-19 | End: 2024-07-18

## 2023-07-18 RX ADMIN — POLYETHYLENE GLYCOL 3350 17 G: 17 POWDER, FOR SOLUTION ORAL at 08:07

## 2023-07-18 RX ADMIN — MUPIROCIN: 20 OINTMENT TOPICAL at 08:07

## 2023-07-18 RX ADMIN — HYDROCHLOROTHIAZIDE 12.5 MG: 12.5 TABLET ORAL at 08:07

## 2023-07-18 RX ADMIN — GABAPENTIN 100 MG: 100 CAPSULE ORAL at 08:07

## 2023-07-18 RX ADMIN — NIFEDIPINE 90 MG: 30 TABLET, FILM COATED, EXTENDED RELEASE ORAL at 08:07

## 2023-07-18 RX ADMIN — ATORVASTATIN CALCIUM 20 MG: 20 TABLET, FILM COATED ORAL at 08:07

## 2023-07-18 RX ADMIN — CLONIDINE HYDROCHLORIDE 0.1 MG: 0.1 TABLET ORAL at 08:07

## 2023-07-18 RX ADMIN — HYDRALAZINE HYDROCHLORIDE 50 MG: 50 TABLET, FILM COATED ORAL at 06:07

## 2023-07-18 NOTE — DISCHARGE SUMMARY
[facility] Medicine  Discharge Summary      Patient Name: Leticia Mejia  MRN: [id number]  EARLINE: [id number]  Patient Class: IP- Inpatient  Admission Date: 7/16/2023  Hospital Length of Stay: 1 days  Discharge Date and Time:  07/18/2023 8:31 AM  Attending Physician: Glen Workman MD   Discharging Provider: Stefani Olsen PA-C  Primary Care Provider: Vicky Barahona MD    Primary Care Team: Networked reference to record PCT     HPI:   Patient is a 73 year old female with medical history of HTN and leukemia who presented to the ED with HTN.  She states there was a miscommunication with the pharmacy and was unable to get her nifedipine refilled.  She is on nifedipine daily, clonidine and hydralazine at home.  Lasix prn.    She denies fever, chills, CP, SOB, nausea and vomiting.      Admitted for hypertensive emergency.   at presentation.  Placed on cardene gtt.                   * No surgery found *      Hospital Course:   Patient admitted for hypertensive emergency.  Started on cardene gtt.  Elevated troponin at admission. Home bp medications resumed and taken off cardene gtt.   Troponin now wnl.  PT HD stable on room air.  BP significantly improved.  Will resume home bp medications.  Will prescribe low dose gabapentin for neuropathy.         Goals of Care Treatment Preferences:  Code Status: Full Code      Consults:   Consults (From admission, onward)        Status Ordering Provider     Inpatient consult to [facility]  Once        Provider:  Sunday Mo MD    Acknowledged BRIAN ARREAGA          Neuro  Neuropathy of both feet  Gabapentin prescribed      Cardiac/Vascular  * Hypertensive emergency without congestive heart failure  Patient has a current diagnosis of Hypertensive emergency with end organ damage evidenced by elevated troponin which is controlled.  Latest blood pressure and vitals reviewed-   Temp:  [97.7 °F (36.5 °C)-98.4 °F (36.9 °C)]   Pulse:  [61-87]    Resp:  [11-43]   BP: (119-190)/()   SpO2:  [94 %-99 %] .   Patient currently off IV antihypertensives.   Home meds for hypertension were reviewed and noted below.   Hypertension Medications             amlodipine (NORVASC) 10 MG tablet Take 1 tablet (10 mg total) by mouth once daily.    cloNIDine (CATAPRES) 0.1 MG tablet Take one tablet by mouth twice a day    cloNIDine (CATAPRES) 0.1 MG tablet TAKE ONE TABLET BY MOUTH TWICE DAILY    furosemide (LASIX) 20 MG tablet Take 20 mg by mouth daily as needed.    furosemide (LASIX) 20 MG tablet Take 1 tablet Once a day Orally 30 days    hydrALAZINE (APRESOLINE) 50 MG tablet Take one tablet by mouth three times a day    hydrALAZINE (APRESOLINE) 50 MG tablet Take one tablet by mouth three times a day with food    hydrochlorothiazide (HYDRODIURIL) 12.5 MG Tab Take 12.5 mg by mouth once daily.    metoprolol tartrate (LOPRESSOR) 50 MG tablet Take 50 mg by mouth 2 (two) times daily.    NIFEdipine (PROCARDIA-XL) 30 MG (OSM) 24 hr tablet Take one tablet by mouth twice a day    NIFEdipine (PROCARDIA-XL) 30 MG (OSM) 24 hr tablet Take one tablet by mouth twice a day    NIFEdipine (PROCARDIA-XL) 60 MG (OSM) 24 hr tablet TAKE 1 TABLET BY MOUTH DAILY.    NIFEdipine (PROCARDIA-XL) 90 MG (OSM) 24 hr tablet Take one tablet by mouth once a day    NIFEdipine (PROCARDIA-XL) 90 MG (OSM) 24 hr tablet TAKE ONE TABLET BY MOUTH ONCE A DAY ON EMPTY STOMACH          Medication adjustment for hospital antihypertensives is as follows- Nifedipine, Hydralazine and clonidine     Will aim for controlled BP reduction by medications noted above. Monitor and mitigate end organ damage as indicated.    7/18 Resolved.  Continue home bp meds.      Elevated troponin  Due to hypertensive emergency  0.040 troponin/ No ST elevation on EKG  Continue to monitor     Resolved       Oncology  Chronic leukemia in remission  Continue home ponatinib         Final Active Diagnoses:    Diagnosis Date Noted POA     PRINCIPAL PROBLEM:  Hypertensive emergency without congestive heart failure [I16.1] 07/17/2023 Yes    Chronic leukemia in remission [C95.11] 07/17/2023 Yes    Elevated troponin [R77.8] 07/17/2023 Yes    Neuropathy of both feet [G57.93] 07/17/2023 Yes      Problems Resolved During this Admission:       Discharged Condition: good    Disposition: Home-Health Care [facility]    Follow Up:    Patient Instructions:      Ambulatory referral/consult to Home Health   Standing Status: Future   Referral Priority: Routine Referral Type: Home Health   Referral Reason: Specialty Services Required   Requested Specialty: Home Health Services   Number of Visits Requested: 1       Significant Diagnostic Studies:   Recent Labs   Lab 07/16/23 2050 07/17/23  0511 07/18/23  0320   BILITOT 0.3 0.3 0.3         Blood Culture: No results for input(s): LABBLOO in the last 48 hours.  BMP:       Recent Labs   Lab 07/18/23  0320         K 3.8      CO2 23   BUN 15   CREATININE 0.9   CALCIUM 9.5   MG 1.9         CBC:         Recent Labs   Lab 07/16/23 2050 07/17/23  0511 07/18/23  0320   WBC 3.71* 8.01 4.03   HGB 11.8* 12.6 11.7*   HCT 37.5 39.7 37.0    210 204         CMP:         Recent Labs   Lab 07/16/23 2050 07/17/23  0511 07/18/23  0320    141 143   K 3.9 3.5 3.8    107 108   CO2 24 21* 23   GLU 96 103 100   BUN 16 14 15   CREATININE 1.0 0.8 0.9   CALCIUM 9.4 10.2 9.5   PROT 6.9 7.9 6.9   ALBUMIN 3.8 4.2 3.7   BILITOT 0.3 0.3 0.3   ALKPHOS 46* 53* 46*   AST 15 18 16   ALT 12 9* 8*   ANIONGAP 10 13 12         Cardiac Markers:       Recent Labs   Lab 07/16/23 2050   BNP <10         Coagulation: No results for input(s): PT, INR, APTT in the last 48 hours.  Lactic Acid: No results for input(s): LACTATE in the last 48 hours.  Lipase: No results for input(s): LIPASE in the last 48 hours.  Lipid Panel: No results for input(s): CHOL, HDL, LDLCALC, TRIG, CHOLHDL in the last 48 hours.  Magnesium:       Recent Labs    Lab 07/18/23  0320   MG 1.9      POCT Glucose: No results for input(s): POCTGLUCOSE in the last 48 hours.  Prealbumin: No results for input(s): PREALBUMIN in the last 48 hours.  Respiratory Culture: No results for input(s): GSRESP, RESPIRATORYC in the last 48 hours.  Troponin:         Recent Labs   Lab 07/17/23  1625 07/17/23  2148 07/18/23  0319   TROPONINI 0.037* 0.046* 0.018         TSH: No results for input(s): TSH in the last 4320 hours.  Urine Culture: No results for input(s): LABURIN in the last 48 hours.  Urine Studies:       Recent Labs   Lab 07/16/23  2202   COLORU Yellow   APPEARANCEUA Clear   PHUR 7.0   SPECGRAV 1.020   PROTEINUA 2+*   GLUCUA Negative   KETONESU Negative   BILIRUBINUA Negative   OCCULTUA Negative   NITRITE Negative   UROBILINOGEN Negative   LEUKOCYTESUR 1+*   RBCUA 1   WBCUA 11*   BACTERIA Moderate*   SQUAMEPITHEL 10   HYALINECASTS 0            Significant Imaging: I have reviewed all pertinent imaging results/findings within the past 24 hours.              Pending Diagnostic Studies:     Procedure Component Value Units Date/Time    Troponin I [[id number]]     Order Status: Sent Lab Status: No result     Specimen: Blood          Medications:  Reconciled Home Medications:      Medication List      START taking these medications    gabapentin 100 MG capsule  Commonly known as: NEURONTIN  Take 1 capsule (100 mg total) by mouth once daily.  Start taking on: July 19, 2023        CHANGE how you take these medications    atorvastatin 40 MG tablet  Commonly known as: LIPITOR  Take 1 tablet orally once a day.  What changed: Another medication with the same name was removed. Continue taking this medication, and follow the directions you see here.     cloNIDine 0.1 MG tablet  Commonly known as: CATAPRES  TAKE ONE TABLET BY MOUTH TWICE DAILY  What changed: Another medication with the same name was removed. Continue taking this medication, and follow the directions you see here.     furosemide 20 MG  tablet  Commonly known as: LASIX  Take 20 mg by mouth daily as needed.  What changed: Another medication with the same name was removed. Continue taking this medication, and follow the directions you see here.     hydrALAZINE 50 MG tablet  Commonly known as: APRESOLINE  Take one tablet by mouth three times a day with food  What changed: Another medication with the same name was removed. Continue taking this medication, and follow the directions you see here.     ICLUSIG 30 mg Tab  Generic drug: PONATinib  Take 1 tablet by mouth daily as needed.  What changed: Another medication with the same name was removed. Continue taking this medication, and follow the directions you see here.     NIFEdipine 90 MG (OSM) 24 hr tablet  Commonly known as: PROCARDIA-XL  Take one tablet by mouth once a day  What changed: Another medication with the same name was removed. Continue taking this medication, and follow the directions you see here.        CONTINUE taking these medications    hydroCHLOROthiazide 12.5 MG Tab  Commonly known as: HYDRODIURIL  Take 12.5 mg by mouth once daily.     latanoprost 0.005 % ophthalmic solution  instill 1 drop into affected eye(s) once daily in the evening        STOP taking these medications    amLODIPine 10 MG tablet  Commonly known as: NORVASC     HYDROcodone-acetaminophen  mg per tablet  Commonly known as: NORCO     meclizine 12.5 mg tablet  Commonly known as: ANTIVERT     meclizine 25 mg tablet  Commonly known as: ANTIVERT     methylPREDNISolone 4 mg tablet  Commonly known as: MEDROL DOSEPACK     metoprolol tartrate 50 MG tablet  Commonly known as: LOPRESSOR     ondansetron 4 MG Tbdl  Commonly known as: ZOFRAN-ODT     potassium chloride SA 10 MEQ tablet  Commonly known as: K-DUR,KLOR-CON M            Indwelling Lines/Drains at time of discharge:   Lines/Drains/Airways     Drain  Duration           Female External Urinary Catheter 07/17/23 0430 1 day                Time spent on the discharge  of patient: 28 minutes    Critical care time spent on the evaluation and treatment of severe organ dysfunction, review of pertinent labs and imaging studies, discussions with consulting providers and discussions with patient/family: 28 minutes.     Stefani Olsen PA-C  Department of [facility] Medicine  [place] - Intensive Care

## 2023-07-18 NOTE — PLAN OF CARE
[facility] - Intensive Care  Discharge Final Note    Primary Care Provider: Vicky Barahona MD    Expected Discharge Date: 7/18/2023    Final Discharge Note (most recent)       Final Note - 07/18/23 1250          Final Note    Assessment Type Final Discharge Note     Anticipated Discharge Disposition [place] [facility]     Hospital Resources/Appts/Education Provided Appointments scheduled and added to AVS        Post-Acute Status    Post-Acute Authorization Home Health     Home Health Status Set-up Complete/Auth obtained     Discharge Delays None known at this time                     Important Message from Medicare  Important Message from Medicare regarding Discharge Appeal Rights: Given to patient/caregiver, Explained to patient/caregiver, Signed/date by patient/caregiver     Date IMM was signed: 07/18/23  Time IMM was signed: 0833    Contact Info       Vicky Barahona MD   Specialty: Internal Medicine   Relationship: PCP - General    [address]  [place] [address]   Phone: [phone]       Next Steps: Go on 7/25/2023    Instructions: Hospital Follow-up at 1:30pm          Patient discharging home with family and [facility].

## 2023-07-18 NOTE — EICU
Intervention Initiated From:  [facility] / EICU    Brenton intervened regarding:  Rounding (Video assessment)    Nurse Notified:  No    Doctor Notified:  No    Comments: Video rounding completed. Awake, alert and oriented. Respirations even and unlabored. VSS. NAD.

## 2023-07-18 NOTE — PT/OT/SLP EVAL
"Physical Therapy Evaluation and Discharge Note    Patient Name:  Leticia Mejia   MRN:  [id number]    Recommendations:     Discharge Recommendations: home with home health, home health PT, home health OT  Discharge Equipment Recommendations: walker, rolling   Barriers to discharge: None    Assessment:     Leticia Mejia is a 73 y.o. female admitted with a medical diagnosis of Hypertensive emergency without congestive heart failure. .  At this time, patient is functioning at their prior level of function and does not require further acute PT services.     Recent Surgery: * No surgery found *      Plan:     During this hospitalization, patient does not require further acute PT services.  Please re-consult if situation changes.      Subjective     Chief Complaint: slight pain on left hip upon standing and walking - per patient  Patient/Family Comments/goals: "to return home today".  Pain/Comfort:  Pain Rating 1:  (did not rate)  Location - Side 1: Left  Location - Orientation 1: generalized  Location 1: hip (upon standing and walking; did not rate pain)  Pain Addressed 1: Cessation of Activity, Reposition  Pain Rating Post-Intervention 1:  (Pt did not rate pain)    Patients cultural, spiritual, Islam conflicts given the current situation:      Living Environment:  Pt lives with grand daughter in [facility] with 3 KYAW with 2 handrails. Pt reports that she has 24 hour supervision with grandchildren each staying with her throughout the week.   Prior to admission, patients level of function was Modified Independent with basic ADL's and gait functions using st cane.  Equipment used at home: rollator, shower chair, bedside commode, cane, straight.  DME owned (not currently used): none.  Upon discharge, patient will have assistance from family.    Objective:     Communicated with patient prior to session.  Patient found HOB elevated with blood pressure cuff, peripheral IV, telemetry, pulse ox (continuous) upon PT entry to " room.    General Precautions: Standard, fall    Orthopedic Precautions:N/A   Braces: N/A  Respiratory Status: Room air    Exams:  Cognitive Exam:  Patient is oriented to Person, Place, Time, and Situation  Fine Motor Coordination:    -       Intact  Gross Motor Coordination:  WFL  Postural Exam:  Patient presented with the following abnormalities:    -       Rounded shoulders  -       Forward head  Skin Integrity/Edema:      -       Skin integrity: Visible skin intact  RLE ROM: WFL  RLE Strength: WFL  LLE ROM: WFL  LLE Strength: WFL    Functional Mobility:  Bed Mobility:     Rolling Left:  independence  Rolling Right: independence  Scooting: independence  Supine to Sit: independence  Sit to Supine: modified independence  Transfers:     Sit to Stand:  modified independence with rolling walker  Gait: Standby Assistance x ~60 feet using RW. Reciprocal gait    AM-PAC 6 CLICK MOBILITY  Total Score:21       Treatment and Education:  PT eval. Educated patient the proper body sequence in out of bed activity, home ex program for B LE strengthening ROM ex  and safety measures with gait functions using RW or St Cane for left hip arthritic pain mgt. Further acute PT tx is not recommended at this time.    AM-PAC 6 CLICK MOBILITY  Total Score:21     Patient left HOB elevated with all lines intact, call button in reach, nursing  notified, and family present.    GOALS:   Multidisciplinary Problems       Physical Therapy Goals       Not on file                    History:     Past Medical History:   Diagnosis Date    Hypertension     Leukemia        Past Surgical History:   Procedure Laterality Date    TONSILLECTOMY      WRIST FRACTURE SURGERY Left 07/01/1994       Time Tracking:     PT Received On: 07/18/23  PT Start Time: 0920     PT Stop Time: 0950  PT Total Time (min): 30 min     Billable Minutes: Evaluation 15 and Therapeutic Activity 15      07/18/2023

## 2023-07-18 NOTE — PLAN OF CARE
Problem: Occupational Therapy  Goal: Occupational Therapy Goal  Description: Pt to perform UB dressing with MOD I seated EOB.  Pt to perform self toileting with MIN A using BSC.  Pt to perform level functional transfers required for ADL's with CGA, RW level.  Pt to demonstrate fair+ dynamic standing balance as required to perform ADL's from standing level.  Pt to participate in standing ADL activity at sink for >5 minutes for increased activity tolerance and safety upon discharge.  Outcome: Ongoing, Progressing   Cont with OT POC

## 2023-07-18 NOTE — PROGRESS NOTES
[facility] Medicine  Progress Note    Patient Name: Leticia Mejia  MRN: [id number]  Patient Class: IP- Inpatient   Admission Date: 7/16/2023  Length of Stay: 1 days  Attending Physician: Glen Workman MD  Primary Care Provider: Vicky Barahona MD        Subjective:     Principal Problem:Hypertensive emergency without congestive heart failure        HPI:  Patient is a 73 year old female with medical history of HTN and leukemia who presented to the ED with HTN.  She states there was a miscommunication with the pharmacy and was unable to get her nifedipine refilled.  She is on nifedipine daily, clonidine and hydralazine at home.  Lasix prn.    She denies fever, chills, CP, SOB, nausea and vomiting.      Admitted for hypertensive emergency.   at presentation.  Placed on cardene gtt.                   Overview/Hospital Course:  PT HD stable on room air.  BP significantly improved.  Will resume home bp medications.  Will prescribe low dose gabapentin for neuropathy.        Past Medical History:   Diagnosis Date    Hypertension     Leukemia        Past Surgical History:   Procedure Laterality Date    TONSILLECTOMY      WRIST FRACTURE SURGERY Left 07/01/1994       Review of patient's allergies indicates:   Allergen Reactions    Dye     Sulfa (sulfonamide antibiotics)     Zestril [lisinopril] Swelling       No current facility-administered medications on file prior to encounter.     Current Outpatient Medications on File Prior to Encounter   Medication Sig    amlodipine (NORVASC) 10 MG tablet Take 1 tablet (10 mg total) by mouth once daily.    atorvastatin (LIPITOR) 20 MG tablet Take 20 mg by mouth once daily.    atorvastatin (LIPITOR) 40 MG tablet Take 1 tablet orally once a day.    atorvastatin (LIPITOR) 40 MG tablet Take 1 tablet orally once a day.    COURTESY GHAZAL, APPT. NEEDED    cloNIDine (CATAPRES) 0.1 MG tablet Take one tablet by mouth twice a day    cloNIDine (CATAPRES)  0.1 MG tablet TAKE ONE TABLET BY MOUTH TWICE DAILY    furosemide (LASIX) 20 MG tablet Take 20 mg by mouth daily as needed.    furosemide (LASIX) 20 MG tablet Take 1 tablet Once a day Orally 30 days    hydrALAZINE (APRESOLINE) 50 MG tablet Take one tablet by mouth three times a day    hydrALAZINE (APRESOLINE) 50 MG tablet Take one tablet by mouth three times a day with food    hydrochlorothiazide (HYDRODIURIL) 12.5 MG Tab Take 12.5 mg by mouth once daily.    HYDROcodone-acetaminophen (NORCO)  mg per tablet TAKE 1 TABLET BY MOUTH EVERY DAY    HYDROcodone-acetaminophen (NORCO)  mg per tablet Take 1 table by mouth every day.    ICLUSIG 30 mg Tab Take 1 tablet by mouth daily as needed.    latanoprost 0.005 % ophthalmic solution instill 1 drop into affected eye(s) once daily in the evening    meclizine (ANTIVERT) 12.5 mg tablet Take 1 tablet (12.5 mg total) by mouth 3 (three) times daily as needed for Dizziness.    meclizine (ANTIVERT) 25 mg tablet Take 1 tablet by mouth 2 times a day.    methylPREDNISolone (MEDROL DOSEPACK) 4 mg tablet Pack as directed    metoprolol tartrate (LOPRESSOR) 50 MG tablet Take 50 mg by mouth 2 (two) times daily.    NIFEdipine (PROCARDIA-XL) 30 MG (OSM) 24 hr tablet Take one tablet by mouth twice a day    NIFEdipine (PROCARDIA-XL) 30 MG (OSM) 24 hr tablet Take one tablet by mouth twice a day    NIFEdipine (PROCARDIA-XL) 60 MG (OSM) 24 hr tablet TAKE 1 TABLET BY MOUTH DAILY.    NIFEdipine (PROCARDIA-XL) 90 MG (OSM) 24 hr tablet Take one tablet by mouth once a day    NIFEdipine (PROCARDIA-XL) 90 MG (OSM) 24 hr tablet TAKE ONE TABLET BY MOUTH ONCE A DAY ON EMPTY STOMACH    ondansetron (ZOFRAN-ODT) 4 MG TbDL Take 1 tablet (4 mg total) by mouth every 8 (eight) hours as needed (nausea).    ponatinib (ICLUSIG) 15 mg Tab Take by mouth.    potassium chloride SA (K-DUR,KLOR-CON) 10 MEQ tablet Take 10 mEq by mouth 2 (two) times daily.     Family History       Problem  Relation (Age of Onset)    Cancer Mother    Heart disease Maternal Uncle    Hypertension Father, Sister, Daughter, Son          Tobacco Use    Smoking status: Never    Smokeless tobacco: Never   Substance and Sexual Activity    Alcohol use: Not on file    Drug use: No    Sexual activity: Not Currently     Review of Systems   Constitutional:  Negative for chills and fever.   HENT:  Negative for ear discharge and ear pain.    Eyes:  Negative for pain and discharge.   Respiratory:  Negative for cough and shortness of breath.    Cardiovascular:  Negative for chest pain and leg swelling.   Gastrointestinal:  Negative for nausea and vomiting.   Endocrine: Negative for cold intolerance and heat intolerance.   Genitourinary:  Negative for difficulty urinating and dysuria.   Musculoskeletal:  Positive for arthralgias. Negative for joint swelling and myalgias.   Skin:  Negative for rash and wound.   Neurological:  Negative for dizziness and headaches.   Psychiatric/Behavioral:  Negative for agitation and confusion.    Objective:     Vital Signs (Most Recent):  Temp: 98 °F (36.7 °C) (07/18/23 0703)  Pulse: 65 (07/18/23 0726)  Resp: 17 (07/18/23 0726)  BP: (!) 148/75 (07/18/23 0811)  SpO2: 95 % (07/18/23 0726) Vital Signs (24h Range):  Temp:  [97.7 °F (36.5 °C)-98.4 °F (36.9 °C)] 98 °F (36.7 °C)  Pulse:  [61-87] 65  Resp:  [11-43] 17  SpO2:  [94 %-99 %] 95 %  BP: (119-190)/() 148/75     Weight: 79 kg (174 lb 2.6 oz)  Body mass index is 29.9 kg/m².     Physical Exam  Constitutional:       General: She is not in acute distress.  HENT:      Head: Normocephalic and atraumatic.   Eyes:      General:         Right eye: No discharge.         Left eye: No discharge.   Cardiovascular:      Rate and Rhythm: Normal rate and regular rhythm.   Pulmonary:      Effort: Pulmonary effort is normal.      Breath sounds: Normal breath sounds.   Abdominal:      General: There is no distension.      Tenderness: There is no abdominal  tenderness.   Musculoskeletal:         General: No swelling or tenderness.      Cervical back: Neck supple. No tenderness.   Skin:     General: Skin is warm and dry.   Neurological:      General: No focal deficit present.      Mental Status: She is alert and oriented to person, place, and time.   Psychiatric:         Mood and Affect: Mood normal.         Behavior: Behavior normal.              Significant Labs: A1C: No results for input(s): HGBA1C in the last 4320 hours.  ABGs: No results for input(s): PH, PCO2, HCO3, POCSATURATED, BE, TOTALHB, COHB, METHB, O2HB, POCFIO2, PO2 in the last 48 hours.  Bilirubin:   Recent Labs   Lab 07/16/23 2050 07/17/23  0511 07/18/23  0320   BILITOT 0.3 0.3 0.3       Blood Culture: No results for input(s): LABBLOO in the last 48 hours.  BMP:   Recent Labs   Lab 07/18/23 0320         K 3.8      CO2 23   BUN 15   CREATININE 0.9   CALCIUM 9.5   MG 1.9       CBC:   Recent Labs   Lab 07/16/23 2050 07/17/23  0511 07/18/23  0320   WBC 3.71* 8.01 4.03   HGB 11.8* 12.6 11.7*   HCT 37.5 39.7 37.0    210 204       CMP:   Recent Labs   Lab 07/16/23 2050 07/17/23  0511 07/18/23  0320    141 143   K 3.9 3.5 3.8    107 108   CO2 24 21* 23   GLU 96 103 100   BUN 16 14 15   CREATININE 1.0 0.8 0.9   CALCIUM 9.4 10.2 9.5   PROT 6.9 7.9 6.9   ALBUMIN 3.8 4.2 3.7   BILITOT 0.3 0.3 0.3   ALKPHOS 46* 53* 46*   AST 15 18 16   ALT 12 9* 8*   ANIONGAP 10 13 12       Cardiac Markers:   Recent Labs   Lab 07/16/23 2050   BNP <10       Coagulation: No results for input(s): PT, INR, APTT in the last 48 hours.  Lactic Acid: No results for input(s): LACTATE in the last 48 hours.  Lipase: No results for input(s): LIPASE in the last 48 hours.  Lipid Panel: No results for input(s): CHOL, HDL, LDLCALC, TRIG, CHOLHDL in the last 48 hours.  Magnesium:   Recent Labs   Lab 07/18/23  0320   MG 1.9     POCT Glucose: No results for input(s): POCTGLUCOSE in the last 48  hours.  Prealbumin: No results for input(s): PREALBUMIN in the last 48 hours.  Respiratory Culture: No results for input(s): GSRESP, RESPIRATORYC in the last 48 hours.  Troponin:   Recent Labs   Lab 07/17/23  1625 07/17/23  2148 07/18/23  0319   TROPONINI 0.037* 0.046* 0.018       TSH: No results for input(s): TSH in the last 4320 hours.  Urine Culture: No results for input(s): LABURIN in the last 48 hours.  Urine Studies:   Recent Labs   Lab 07/16/23  2202   COLORU Yellow   APPEARANCEUA Clear   PHUR 7.0   SPECGRAV 1.020   PROTEINUA 2+*   GLUCUA Negative   KETONESU Negative   BILIRUBINUA Negative   OCCULTUA Negative   NITRITE Negative   UROBILINOGEN Negative   LEUKOCYTESUR 1+*   RBCUA 1   WBCUA 11*   BACTERIA Moderate*   SQUAMEPITHEL 10   HYALINECASTS 0         Significant Imaging: I have reviewed all pertinent imaging results/findings within the past 24 hours.      Assessment/Plan:      * Hypertensive emergency without congestive heart failure  Patient has a current diagnosis of Hypertensive emergency with end organ damage evidenced by elevated troponin which is controlled.  Latest blood pressure and vitals reviewed-   Temp:  [97.7 °F (36.5 °C)-98.4 °F (36.9 °C)]   Pulse:  [61-87]   Resp:  [11-43]   BP: (119-190)/()   SpO2:  [94 %-99 %] .   Patient currently off IV antihypertensives.   Home meds for hypertension were reviewed and noted below.   Hypertension Medications             amlodipine (NORVASC) 10 MG tablet Take 1 tablet (10 mg total) by mouth once daily.    cloNIDine (CATAPRES) 0.1 MG tablet Take one tablet by mouth twice a day    cloNIDine (CATAPRES) 0.1 MG tablet TAKE ONE TABLET BY MOUTH TWICE DAILY    furosemide (LASIX) 20 MG tablet Take 20 mg by mouth daily as needed.    furosemide (LASIX) 20 MG tablet Take 1 tablet Once a day Orally 30 days    hydrALAZINE (APRESOLINE) 50 MG tablet Take one tablet by mouth three times a day    hydrALAZINE (APRESOLINE) 50 MG tablet Take one tablet by mouth three  times a day with food    hydrochlorothiazide (HYDRODIURIL) 12.5 MG Tab Take 12.5 mg by mouth once daily.    metoprolol tartrate (LOPRESSOR) 50 MG tablet Take 50 mg by mouth 2 (two) times daily.    NIFEdipine (PROCARDIA-XL) 30 MG (OSM) 24 hr tablet Take one tablet by mouth twice a day    NIFEdipine (PROCARDIA-XL) 30 MG (OSM) 24 hr tablet Take one tablet by mouth twice a day    NIFEdipine (PROCARDIA-XL) 60 MG (OSM) 24 hr tablet TAKE 1 TABLET BY MOUTH DAILY.    NIFEdipine (PROCARDIA-XL) 90 MG (OSM) 24 hr tablet Take one tablet by mouth once a day    NIFEdipine (PROCARDIA-XL) 90 MG (OSM) 24 hr tablet TAKE ONE TABLET BY MOUTH ONCE A DAY ON EMPTY STOMACH          Medication adjustment for hospital antihypertensives is as follows- Nifedipine, Hydralazine and clonidine     Will aim for controlled BP reduction by medications noted above. Monitor and mitigate end organ damage as indicated.    7/18 Resolved.  Continue home bp meds.      Neuropathy of both feet  Gabapentin prescribed      Elevated troponin  Due to hypertensive emergency  0.040 troponin/ No ST elevation on EKG  Continue to monitor     Resolved       Chronic leukemia in remission  Continue home ponatinib         VTE Risk Mitigation (From admission, onward)         Ordered     IP VTE HIGH RISK PATIENT  Once         07/17/23 0414     Place sequential compression device  Until discontinued         07/17/23 0414                Discharge Planning   OLE:      Code Status: Full Code   Is the patient medically ready for discharge?:     Reason for patient still in hospital (select all that apply): Other (specify) discharge home  Discharge Plan A: Home with family, Home Health            Critical care time spent on the evaluation and treatment of severe organ dysfunction, review of pertinent labs and imaging studies, discussions with consulting providers and discussions with patient/family: 25 minutes.      Stefani Olsen PA-C  Department of Hospital Medicine   [facility] - Intensive Care

## 2023-07-18 NOTE — PT/OT/SLP PROGRESS
"Occupational Therapy   Treatment    Name: Leticia Mejia  MRN: [id number]  Admitting Diagnosis:  Hypertensive emergency without congestive heart failure       Recommendations:     Discharge Recommendations: home with home health, home health PT, home health OT  Discharge Equipment Recommendations:  walker, rolling  Barriers to discharge:  Other (Comment) (Increased weakness and poor endurance)    Assessment:     Leticia Mejia is a 73 y.o. female with a medical diagnosis of Hypertensive emergency without congestive heart failure.   Performance deficits affecting function are weakness, impaired endurance, impaired self care skills, impaired functional mobility, gait instability, impaired balance, decreased upper extremity function, decreased lower extremity function, pain.     Rehab Prognosis:  Good; patient would benefit from acute skilled OT services to address these deficits and reach maximum level of function.       Plan:     Patient to be seen 5 x/week to address the above listed problems via self-care/home management, therapeutic activities, therapeutic exercises  Plan of Care Expires: 07/31/23  Plan of Care Reviewed with: patient    Subjective     Chief Complaint: "My hip is stiff this morning"  Patient/Family Comments/goals: "I usually take something first thing in the morning for my hip"  Pain/Comfort:  Pain Rating 1:  (Pt did not rate)  Location - Side 1: Left  Location - Orientation 1: generalized  Location 1: hip  Pain Addressed 1: Nurse notified  Pain Rating Post-Intervention 1: other (see comments) (Pt did not rate)    Objective:     Communicated with: RN and PA prior to session.  Patient found HOB elevated with telemetry, peripheral IV, pulse ox (continuous), blood pressure cuff, PureWick upon OT entry to room.    General Precautions: Standard, fall    Orthopedic Precautions:N/A  Braces: N/A  Respiratory Status: Room air     Occupational Performance:     Bed Mobility:    Patient completed Rolling/Turning to " Left with  modified independence  Patient completed Scooting/Bridging with modified independence  Patient completed Supine to Sit with modified independence  Patient completed Sit to Supine with independence     Functional Mobility/Transfers:  Patient completed Sit <> Stand Transfer with modified independence  with  no assistive device   Patient completed Bed <> Chair Transfer using Step Transfer technique with contact guard assistance with no assistive device  Functional Mobility: Pt required CGA to transfer to bedside chair.    Activities of Daily Living:  Grooming: independence brushing teeth  Lower Body Dressing: minimum assistance to collette and doff socks      [facility] 6 Click ADL: 21    Treatment & Education:  Pt rolled to L side with Mod I and up to sitting on EOB. Pt scooted to EOB to place feet on the floor with Mod I. Pt stood with Mod I and transferred to bedside chair with CGA. While in the chair pt was able to collette and doff sock on R foot but was dependent to place it on L foot due to hip pain and stiffness. Pt stood at bedside table for 3 minutes with supervision and then needed to sit due to hip pain. Pt brushed teeth at bedside table while seated independently. Pt stood from bedside chair and ambulated over to bed with CGA and was able to return to supine independently. Pt repositioned self in bed independently. Pt faye tx well.     Patient left HOB elevated with all lines intact, call button in reach, and RN notified    GOALS:   Multidisciplinary Problems       Occupational Therapy Goals          Problem: Occupational Therapy    Goal Priority Disciplines Outcome Interventions   Occupational Therapy Goal     OT, PT/OT Ongoing, Progressing    Description: Pt to perform UB dressing with MOD I seated EOB.  Pt to perform self toileting with MIN A using BSC.  Pt to perform level functional transfers required for ADL's with CGA, RW level.  Pt to demonstrate fair+ dynamic standing balance as required to perform  ADL's from standing level.  Pt to participate in standing ADL activity at sink for >5 minutes for increased activity tolerance and safety upon discharge.                       Time Tracking:     OT Date of Treatment: 07/18/23  OT Start Time: 0842  OT Stop Time: 0924  OT Total Time (min): 42 min    Billable Minutes:Self Care/Home Management 15  Therapeutic Activity 27    OT/MACO: MACO     Number of MACO visits since last OT visit: 1    7/18/2023

## 2023-07-18 NOTE — EICU
Intervention Initiated From:  COR / EICU    Brenton intervened regarding:  Documentation    Nurse Notified:  No    Doctor Notified:  No    Comments: Receiving D/C orders to go home. Nurse at bedside reviewing discharge summary. Family all at bedside.

## 2023-07-18 NOTE — HOSPITAL COURSE
Patient admitted for hypertensive emergency.  Started on cardene gtt.  Elevated troponin at admission. Home bp medications resumed and taken off cardene gtt.   Troponin now wnl.  PT HD stable on room air.  BP significantly improved.  Will resume home bp medications.  Will prescribe low dose gabapentin for neuropathy.

## 2023-07-18 NOTE — ASSESSMENT & PLAN NOTE
Due to hypertensive emergency  0.040 troponin/ No ST elevation on EKG  Continue to monitor     Resolved

## 2023-07-18 NOTE — SUBJECTIVE & OBJECTIVE
Past Medical History:   Diagnosis Date    Hypertension     Leukemia        Past Surgical History:   Procedure Laterality Date    TONSILLECTOMY      WRIST FRACTURE SURGERY Left 07/01/1994       Review of patient's allergies indicates:   Allergen Reactions    Dye     Sulfa (sulfonamide antibiotics)     Zestril [lisinopril] Swelling       No current facility-administered medications on file prior to encounter.     Current Outpatient Medications on File Prior to Encounter   Medication Sig    amlodipine (NORVASC) 10 MG tablet Take 1 tablet (10 mg total) by mouth once daily.    atorvastatin (LIPITOR) 20 MG tablet Take 20 mg by mouth once daily.    atorvastatin (LIPITOR) 40 MG tablet Take 1 tablet orally once a day.    atorvastatin (LIPITOR) 40 MG tablet Take 1 tablet orally once a day.    COURTESY GHAZAL, APPT. NEEDED    cloNIDine (CATAPRES) 0.1 MG tablet Take one tablet by mouth twice a day    cloNIDine (CATAPRES) 0.1 MG tablet TAKE ONE TABLET BY MOUTH TWICE DAILY    furosemide (LASIX) 20 MG tablet Take 20 mg by mouth daily as needed.    furosemide (LASIX) 20 MG tablet Take 1 tablet Once a day Orally 30 days    hydrALAZINE (APRESOLINE) 50 MG tablet Take one tablet by mouth three times a day    hydrALAZINE (APRESOLINE) 50 MG tablet Take one tablet by mouth three times a day with food    hydrochlorothiazide (HYDRODIURIL) 12.5 MG Tab Take 12.5 mg by mouth once daily.    HYDROcodone-acetaminophen (NORCO)  mg per tablet TAKE 1 TABLET BY MOUTH EVERY DAY    HYDROcodone-acetaminophen (NORCO)  mg per tablet Take 1 table by mouth every day.    ICLUSIG 30 mg Tab Take 1 tablet by mouth daily as needed.    latanoprost 0.005 % ophthalmic solution instill 1 drop into affected eye(s) once daily in the evening    meclizine (ANTIVERT) 12.5 mg tablet Take 1 tablet (12.5 mg total) by mouth 3 (three) times daily as needed for Dizziness.    meclizine (ANTIVERT) 25 mg tablet Take 1 tablet by mouth 2 times a day.    methylPREDNISolone  (MEDROL DOSEPACK) 4 mg tablet Pack as directed    metoprolol tartrate (LOPRESSOR) 50 MG tablet Take 50 mg by mouth 2 (two) times daily.    NIFEdipine (PROCARDIA-XL) 30 MG (OSM) 24 hr tablet Take one tablet by mouth twice a day    NIFEdipine (PROCARDIA-XL) 30 MG (OSM) 24 hr tablet Take one tablet by mouth twice a day    NIFEdipine (PROCARDIA-XL) 60 MG (OSM) 24 hr tablet TAKE 1 TABLET BY MOUTH DAILY.    NIFEdipine (PROCARDIA-XL) 90 MG (OSM) 24 hr tablet Take one tablet by mouth once a day    NIFEdipine (PROCARDIA-XL) 90 MG (OSM) 24 hr tablet TAKE ONE TABLET BY MOUTH ONCE A DAY ON EMPTY STOMACH    ondansetron (ZOFRAN-ODT) 4 MG TbDL Take 1 tablet (4 mg total) by mouth every 8 (eight) hours as needed (nausea).    ponatinib (ICLUSIG) 15 mg Tab Take by mouth.    potassium chloride SA (K-DUR,KLOR-CON) 10 MEQ tablet Take 10 mEq by mouth 2 (two) times daily.     Family History       Problem Relation (Age of Onset)    Cancer Mother    Heart disease Maternal Uncle    Hypertension Father, Sister, Daughter, Son          Tobacco Use    Smoking status: Never    Smokeless tobacco: Never   Substance and Sexual Activity    Alcohol use: Not on file    Drug use: No    Sexual activity: Not Currently     Review of Systems   Constitutional:  Negative for chills and fever.   HENT:  Negative for ear discharge and ear pain.    Eyes:  Negative for pain and discharge.   Respiratory:  Negative for cough and shortness of breath.    Cardiovascular:  Negative for chest pain and leg swelling.   Gastrointestinal:  Negative for nausea and vomiting.   Endocrine: Negative for cold intolerance and heat intolerance.   Genitourinary:  Negative for difficulty urinating and dysuria.   Musculoskeletal:  Positive for arthralgias. Negative for joint swelling and myalgias.   Skin:  Negative for rash and wound.   Neurological:  Negative for dizziness and headaches.   Psychiatric/Behavioral:  Negative for agitation and confusion.    Objective:     Vital Signs  (Most Recent):  Temp: 98 °F (36.7 °C) (07/18/23 0703)  Pulse: 65 (07/18/23 0726)  Resp: 17 (07/18/23 0726)  BP: (!) 148/75 (07/18/23 0811)  SpO2: 95 % (07/18/23 0726) Vital Signs (24h Range):  Temp:  [97.7 °F (36.5 °C)-98.4 °F (36.9 °C)] 98 °F (36.7 °C)  Pulse:  [61-87] 65  Resp:  [11-43] 17  SpO2:  [94 %-99 %] 95 %  BP: (119-190)/() 148/75     Weight: 79 kg (174 lb 2.6 oz)  Body mass index is 29.9 kg/m².     Physical Exam  Constitutional:       General: She is not in acute distress.  HENT:      Head: Normocephalic and atraumatic.   Eyes:      General:         Right eye: No discharge.         Left eye: No discharge.   Cardiovascular:      Rate and Rhythm: Normal rate and regular rhythm.   Pulmonary:      Effort: Pulmonary effort is normal.      Breath sounds: Normal breath sounds.   Abdominal:      General: There is no distension.      Tenderness: There is no abdominal tenderness.   Musculoskeletal:         General: No swelling or tenderness.      Cervical back: Neck supple. No tenderness.   Skin:     General: Skin is warm and dry.   Neurological:      General: No focal deficit present.      Mental Status: She is alert and oriented to person, place, and time.   Psychiatric:         Mood and Affect: Mood normal.         Behavior: Behavior normal.              Significant Labs: A1C: No results for input(s): HGBA1C in the last 4320 hours.  ABGs: No results for input(s): PH, PCO2, HCO3, POCSATURATED, BE, TOTALHB, COHB, METHB, O2HB, POCFIO2, PO2 in the last 48 hours.  Bilirubin:   Recent Labs   Lab 07/16/23  2050 07/17/23  0511 07/18/23  0320   BILITOT 0.3 0.3 0.3       Blood Culture: No results for input(s): LABBLOO in the last 48 hours.  BMP:   Recent Labs   Lab 07/18/23  0320         K 3.8      CO2 23   BUN 15   CREATININE 0.9   CALCIUM 9.5   MG 1.9       CBC:   Recent Labs   Lab 07/16/23  2050 07/17/23  0511 07/18/23  0320   WBC 3.71* 8.01 4.03   HGB 11.8* 12.6 11.7*   HCT 37.5 39.7 37.0     210 204       CMP:   Recent Labs   Lab 07/16/23  2050 07/17/23  0511 07/18/23  0320    141 143   K 3.9 3.5 3.8    107 108   CO2 24 21* 23   GLU 96 103 100   BUN 16 14 15   CREATININE 1.0 0.8 0.9   CALCIUM 9.4 10.2 9.5   PROT 6.9 7.9 6.9   ALBUMIN 3.8 4.2 3.7   BILITOT 0.3 0.3 0.3   ALKPHOS 46* 53* 46*   AST 15 18 16   ALT 12 9* 8*   ANIONGAP 10 13 12       Cardiac Markers:   Recent Labs   Lab 07/16/23 2050   BNP <10       Coagulation: No results for input(s): PT, INR, APTT in the last 48 hours.  Lactic Acid: No results for input(s): LACTATE in the last 48 hours.  Lipase: No results for input(s): LIPASE in the last 48 hours.  Lipid Panel: No results for input(s): CHOL, HDL, LDLCALC, TRIG, CHOLHDL in the last 48 hours.  Magnesium:   Recent Labs   Lab 07/18/23  0320   MG 1.9     POCT Glucose: No results for input(s): POCTGLUCOSE in the last 48 hours.  Prealbumin: No results for input(s): PREALBUMIN in the last 48 hours.  Respiratory Culture: No results for input(s): GSRESP, RESPIRATORYC in the last 48 hours.  Troponin:   Recent Labs   Lab 07/17/23  1625 07/17/23  2148 07/18/23  0319   TROPONINI 0.037* 0.046* 0.018       TSH: No results for input(s): TSH in the last 4320 hours.  Urine Culture: No results for input(s): LABURIN in the last 48 hours.  Urine Studies:   Recent Labs   Lab 07/16/23  2202   COLORU Yellow   APPEARANCEUA Clear   PHUR 7.0   SPECGRAV 1.020   PROTEINUA 2+*   GLUCUA Negative   KETONESU Negative   BILIRUBINUA Negative   OCCULTUA Negative   NITRITE Negative   UROBILINOGEN Negative   LEUKOCYTESUR 1+*   RBCUA 1   WBCUA 11*   BACTERIA Moderate*   SQUAMEPITHEL 10   HYALINECASTS 0         Significant Imaging: I have reviewed all pertinent imaging results/findings within the past 24 hours.

## 2023-07-18 NOTE — ASSESSMENT & PLAN NOTE
Patient has a current diagnosis of Hypertensive emergency with end organ damage evidenced by elevated troponin which is controlled.  Latest blood pressure and vitals reviewed-   Temp:  [97.7 °F (36.5 °C)-98.4 °F (36.9 °C)]   Pulse:  [61-87]   Resp:  [11-43]   BP: (119-190)/()   SpO2:  [94 %-99 %] .   Patient currently off IV antihypertensives.   Home meds for hypertension were reviewed and noted below.   Hypertension Medications             amlodipine (NORVASC) 10 MG tablet Take 1 tablet (10 mg total) by mouth once daily.    cloNIDine (CATAPRES) 0.1 MG tablet Take one tablet by mouth twice a day    cloNIDine (CATAPRES) 0.1 MG tablet TAKE ONE TABLET BY MOUTH TWICE DAILY    furosemide (LASIX) 20 MG tablet Take 20 mg by mouth daily as needed.    furosemide (LASIX) 20 MG tablet Take 1 tablet Once a day Orally 30 days    hydrALAZINE (APRESOLINE) 50 MG tablet Take one tablet by mouth three times a day    hydrALAZINE (APRESOLINE) 50 MG tablet Take one tablet by mouth three times a day with food    hydrochlorothiazide (HYDRODIURIL) 12.5 MG Tab Take 12.5 mg by mouth once daily.    metoprolol tartrate (LOPRESSOR) 50 MG tablet Take 50 mg by mouth 2 (two) times daily.    NIFEdipine (PROCARDIA-XL) 30 MG (OSM) 24 hr tablet Take one tablet by mouth twice a day    NIFEdipine (PROCARDIA-XL) 30 MG (OSM) 24 hr tablet Take one tablet by mouth twice a day    NIFEdipine (PROCARDIA-XL) 60 MG (OSM) 24 hr tablet TAKE 1 TABLET BY MOUTH DAILY.    NIFEdipine (PROCARDIA-XL) 90 MG (OSM) 24 hr tablet Take one tablet by mouth once a day    NIFEdipine (PROCARDIA-XL) 90 MG (OSM) 24 hr tablet TAKE ONE TABLET BY MOUTH ONCE A DAY ON EMPTY STOMACH          Medication adjustment for hospital antihypertensives is as follows- Nifedipine, Hydralazine and clonidine     Will aim for controlled BP reduction by medications noted above. Monitor and mitigate end organ damage as indicated.    7/18 Resolved.  Continue home bp meds.

## 2023-07-19 PROCEDURE — G0180 MD CERTIFICATION HHA PATIENT: HCPCS | Mod: ,,, | Performed by: INTERNAL MEDICINE

## 2023-07-19 PROCEDURE — G0180 PR HOME HEALTH MD CERTIFICATION: ICD-10-PCS | Mod: ,,, | Performed by: INTERNAL MEDICINE

## 2023-08-18 ENCOUNTER — DOCUMENT SCAN (OUTPATIENT)
Dept: HOME HEALTH SERVICES | Facility: HOSPITAL | Age: 73
End: 2023-08-18
Payer: MEDICARE

## 2023-08-21 ENCOUNTER — EXTERNAL HOME HEALTH (OUTPATIENT)
Dept: HOME HEALTH SERVICES | Facility: HOSPITAL | Age: 73
End: 2023-08-21
Payer: MEDICARE

## 2024-09-09 NOTE — NURSING
1908 Lying in bed visiting with family members. Alert and oriented times 4. Denies pain at this time Discussed plan of care with patient. Continuous cardiac monitoring in progress HR 82 NSR and sat 98% on room air. SANNA. External female catheter in progress. Will continue to monitor. Side rails times 2 up, call button in reach, bed low and locked.    0605 Patient slept throughout the night without complications.   [facility]